# Patient Record
Sex: MALE | Race: WHITE | Employment: OTHER | URBAN - METROPOLITAN AREA
[De-identification: names, ages, dates, MRNs, and addresses within clinical notes are randomized per-mention and may not be internally consistent; named-entity substitution may affect disease eponyms.]

---

## 2020-01-01 ENCOUNTER — APPOINTMENT (OUTPATIENT)
Dept: GENERAL RADIOLOGY | Age: 55
DRG: 241 | End: 2020-01-01
Attending: HOSPITALIST
Payer: MEDICAID

## 2020-01-01 ENCOUNTER — HOME CARE VISIT (OUTPATIENT)
Dept: SCHEDULING | Facility: HOME HEALTH | Age: 55
End: 2020-01-01
Payer: MEDICAID

## 2020-01-01 ENCOUNTER — HOSPITAL ENCOUNTER (OUTPATIENT)
Dept: INTERVENTIONAL RADIOLOGY/VASCULAR | Age: 55
Discharge: HOME | End: 2020-10-29
Attending: STUDENT IN AN ORGANIZED HEALTH CARE EDUCATION/TRAINING PROGRAM
Payer: MEDICAID

## 2020-01-01 ENCOUNTER — HOSPITAL ENCOUNTER (OUTPATIENT)
Dept: INTERVENTIONAL RADIOLOGY/VASCULAR | Age: 55
Discharge: HOME | End: 2020-11-25
Attending: STUDENT IN AN ORGANIZED HEALTH CARE EDUCATION/TRAINING PROGRAM
Payer: MEDICAID

## 2020-01-01 ENCOUNTER — HOME CARE VISIT (OUTPATIENT)
Dept: HOSPICE | Facility: HOSPICE | Age: 55
End: 2020-01-01
Payer: MEDICAID

## 2020-01-01 ENCOUNTER — ANESTHESIA EVENT (OUTPATIENT)
Dept: ENDOSCOPY | Age: 55
DRG: 241 | End: 2020-01-01
Payer: MEDICAID

## 2020-01-01 ENCOUNTER — HOSPICE ADMISSION (OUTPATIENT)
Dept: HOSPICE | Facility: HOSPICE | Age: 55
End: 2020-01-01
Payer: MEDICAID

## 2020-01-01 ENCOUNTER — APPOINTMENT (OUTPATIENT)
Dept: CT IMAGING | Age: 55
DRG: 241 | End: 2020-01-01
Attending: HOSPITALIST
Payer: MEDICAID

## 2020-01-01 ENCOUNTER — ANESTHESIA (OUTPATIENT)
Dept: ENDOSCOPY | Age: 55
DRG: 241 | End: 2020-01-01
Payer: MEDICAID

## 2020-01-01 ENCOUNTER — HOSPITAL ENCOUNTER (OUTPATIENT)
Dept: MRI IMAGING | Age: 55
Discharge: HOME | End: 2020-12-01
Attending: STUDENT IN AN ORGANIZED HEALTH CARE EDUCATION/TRAINING PROGRAM
Payer: MEDICAID

## 2020-01-01 ENCOUNTER — HOSPITAL ENCOUNTER (OUTPATIENT)
Dept: LAB | Age: 55
Discharge: HOME | End: 2020-11-23
Payer: MEDICAID

## 2020-01-01 ENCOUNTER — HOSPITAL ENCOUNTER (OUTPATIENT)
Dept: LAB | Age: 55
Discharge: HOME | End: 2020-12-07
Payer: MEDICAID

## 2020-01-01 ENCOUNTER — HOSPITAL ENCOUNTER (OUTPATIENT)
Dept: INTERVENTIONAL RADIOLOGY/VASCULAR | Age: 55
Discharge: HOME | End: 2020-12-18
Attending: STUDENT IN AN ORGANIZED HEALTH CARE EDUCATION/TRAINING PROGRAM
Payer: MEDICAID

## 2020-01-01 ENCOUNTER — TRANSCRIBE ORDER (OUTPATIENT)
Dept: SCHEDULING | Age: 55
End: 2020-01-01

## 2020-01-01 ENCOUNTER — HOSPITAL ENCOUNTER (INPATIENT)
Age: 55
LOS: 1 days | Discharge: HOSPICE | DRG: 241 | End: 2020-12-28
Attending: EMERGENCY MEDICINE | Admitting: HOSPITALIST
Payer: MEDICAID

## 2020-01-01 VITALS — RESPIRATION RATE: 18 BRPM | OXYGEN SATURATION: 95 % | HEART RATE: 87 BPM | TEMPERATURE: 98.5 F

## 2020-01-01 VITALS
HEART RATE: 98 BPM | RESPIRATION RATE: 20 BRPM | TEMPERATURE: 98.5 F | OXYGEN SATURATION: 96 % | SYSTOLIC BLOOD PRESSURE: 165 MMHG | DIASTOLIC BLOOD PRESSURE: 100 MMHG

## 2020-01-01 VITALS — OXYGEN SATURATION: 93 % | SYSTOLIC BLOOD PRESSURE: 154 MMHG | DIASTOLIC BLOOD PRESSURE: 96 MMHG | HEART RATE: 86 BPM

## 2020-01-01 VITALS
TEMPERATURE: 97.7 F | DIASTOLIC BLOOD PRESSURE: 77 MMHG | BODY MASS INDEX: 28.07 KG/M2 | OXYGEN SATURATION: 94 % | HEIGHT: 69 IN | WEIGHT: 189.5 LBS | HEART RATE: 102 BPM | RESPIRATION RATE: 21 BRPM | SYSTOLIC BLOOD PRESSURE: 136 MMHG

## 2020-01-01 VITALS
HEART RATE: 96 BPM | RESPIRATION RATE: 20 BRPM | HEIGHT: 70 IN | TEMPERATURE: 98 F | DIASTOLIC BLOOD PRESSURE: 80 MMHG | BODY MASS INDEX: 27.06 KG/M2 | SYSTOLIC BLOOD PRESSURE: 130 MMHG | WEIGHT: 189 LBS

## 2020-01-01 DIAGNOSIS — K70.31 ALCOHOLIC CIRRHOSIS OF LIVER WITH ASCITES (HCC): ICD-10-CM

## 2020-01-01 DIAGNOSIS — K92.2 GASTROINTESTINAL HEMORRHAGE, UNSPECIFIED GASTROINTESTINAL HEMORRHAGE TYPE: ICD-10-CM

## 2020-01-01 DIAGNOSIS — R16.0 LIVER MASS: ICD-10-CM

## 2020-01-01 DIAGNOSIS — E87.5 ACUTE HYPERKALEMIA: ICD-10-CM

## 2020-01-01 DIAGNOSIS — R63.0 DECREASED APPETITE: ICD-10-CM

## 2020-01-01 DIAGNOSIS — N17.9 ACUTE KIDNEY INJURY (HCC): ICD-10-CM

## 2020-01-01 DIAGNOSIS — K72.90 HEPATIC FAILURE, UNSPECIFIED WITHOUT COMA (HCC): ICD-10-CM

## 2020-01-01 DIAGNOSIS — F41.9 ANXIETY: ICD-10-CM

## 2020-01-01 DIAGNOSIS — K70.31 ASCITES DUE TO ALCOHOLIC CIRRHOSIS (HCC): ICD-10-CM

## 2020-01-01 DIAGNOSIS — K70.31 ASCITES DUE TO ALCOHOLIC CIRRHOSIS (HCC): Primary | ICD-10-CM

## 2020-01-01 DIAGNOSIS — F10.10 ALCOHOL ABUSE, UNCOMPLICATED: ICD-10-CM

## 2020-01-01 DIAGNOSIS — C22.0 HEPATOCELLULAR CARCINOMA (HCC): Primary | Chronic | ICD-10-CM

## 2020-01-01 LAB
25(OH)D3+25(OH)D2 SERPL-MCNC: 22.5 NG/ML (ref 30–100)
ABO + RH BLD: NORMAL
AFP-TM SERPL-MCNC: 68.3 NG/ML
ALBUMIN FLD-MCNC: 0.3 G/DL
ALBUMIN FLD-MCNC: 0.5 G/DL
ALBUMIN SERPL-MCNC: 1.7 G/DL (ref 3.5–5)
ALBUMIN SERPL-MCNC: 1.8 G/DL (ref 3.5–5)
ALBUMIN SERPL-MCNC: 2.1 G/DL (ref 3.5–5)
ALBUMIN SERPL-MCNC: 2.3 G/DL (ref 3.5–5)
ALBUMIN/GLOB SERPL: 0.3 {RATIO} (ref 1.2–3.5)
ALP SERPL-CCNC: 146 U/L (ref 50–136)
ALP SERPL-CCNC: 157 U/L (ref 50–136)
ALP SERPL-CCNC: 86 U/L (ref 50–136)
ALP SERPL-CCNC: 96 U/L (ref 50–136)
ALT SERPL-CCNC: 111 U/L (ref 12–65)
ALT SERPL-CCNC: 155 U/L (ref 12–65)
ALT SERPL-CCNC: 93 U/L (ref 12–65)
ALT SERPL-CCNC: 96 U/L (ref 12–65)
AMMONIA PLAS-SCNC: 22 UMOL/L (ref 24.9–68)
ANION GAP SERPL CALC-SCNC: 14 MMOL/L (ref 7–16)
ANION GAP SERPL CALC-SCNC: 16 MMOL/L (ref 7–16)
ANION GAP SERPL CALC-SCNC: 17 MMOL/L (ref 7–16)
ANION GAP SERPL CALC-SCNC: 8 MMOL/L (ref 7–16)
ANION GAP SERPL CALC-SCNC: 8 MMOL/L (ref 7–16)
APPEARANCE FLD: CLEAR
APPEARANCE FLD: NORMAL
APPEARANCE FLD: NORMAL
APPEARANCE UR: ABNORMAL
APTT PPP: 32.1 SEC (ref 24.1–35.1)
APTT PPP: 33.4 SEC (ref 24.1–35.1)
AST SERPL-CCNC: 176 U/L (ref 15–37)
AST SERPL-CCNC: 195 U/L (ref 15–37)
AST SERPL-CCNC: 252 U/L (ref 15–37)
AST SERPL-CCNC: 273 U/L (ref 15–37)
ATRIAL RATE: 103 BPM
BACTERIA URNS QL MICRO: ABNORMAL /HPF
BASOPHILS # BLD: 0 K/UL (ref 0–0.2)
BASOPHILS NFR BLD: 0 % (ref 0–2)
BASOPHILS NFR BLD: 1 % (ref 0–2)
BILIRUB SERPL-MCNC: 2.4 MG/DL (ref 0.2–1.1)
BILIRUB SERPL-MCNC: 3.5 MG/DL (ref 0.2–1.1)
BILIRUB SERPL-MCNC: 3.8 MG/DL (ref 0.2–1.1)
BILIRUB SERPL-MCNC: 4.4 MG/DL (ref 0.2–1.1)
BILIRUB UR QL: ABNORMAL
BLD PROD TYP BPU: NORMAL
BLD PROD TYP BPU: NORMAL
BLOOD GROUP ANTIBODIES SERPL: NORMAL
BPU ID: NORMAL
BPU ID: NORMAL
BUN SERPL-MCNC: 22 MG/DL (ref 6–23)
BUN SERPL-MCNC: 27 MG/DL (ref 6–23)
BUN SERPL-MCNC: 86 MG/DL (ref 6–23)
BUN SERPL-MCNC: 88 MG/DL (ref 6–23)
BUN SERPL-MCNC: 90 MG/DL (ref 6–23)
CALCIUM SERPL-MCNC: 8.3 MG/DL (ref 8.3–10.4)
CALCIUM SERPL-MCNC: 8.7 MG/DL (ref 8.3–10.4)
CALCIUM SERPL-MCNC: 8.8 MG/DL (ref 8.3–10.4)
CALCIUM SERPL-MCNC: 9.4 MG/DL (ref 8.3–10.4)
CALCIUM SERPL-MCNC: 9.5 MG/DL (ref 8.3–10.4)
CALCULATED P AXIS, ECG09: 34 DEGREES
CALCULATED R AXIS, ECG10: 44 DEGREES
CALCULATED T AXIS, ECG11: -11 DEGREES
CASTS URNS QL MICRO: ABNORMAL /LPF
CEA SERPL-MCNC: 4.3 NG/ML (ref 0–3)
CHLORIDE SERPL-SCNC: 102 MMOL/L (ref 98–107)
CHLORIDE SERPL-SCNC: 106 MMOL/L (ref 98–107)
CHLORIDE SERPL-SCNC: 106 MMOL/L (ref 98–107)
CHLORIDE SERPL-SCNC: 108 MMOL/L (ref 98–107)
CHLORIDE SERPL-SCNC: 99 MMOL/L (ref 98–107)
CO2 SERPL-SCNC: 12 MMOL/L (ref 21–32)
CO2 SERPL-SCNC: 13 MMOL/L (ref 21–32)
CO2 SERPL-SCNC: 14 MMOL/L (ref 21–32)
CO2 SERPL-SCNC: 21 MMOL/L (ref 21–32)
CO2 SERPL-SCNC: 23 MMOL/L (ref 21–32)
COLOR FLD: NORMAL
COLOR FLD: YELLOW
COLOR FLD: YELLOW
COLOR UR: ABNORMAL
CREAT SERPL-MCNC: 1.04 MG/DL (ref 0.8–1.5)
CREAT SERPL-MCNC: 1.1 MG/DL (ref 0.8–1.5)
CREAT SERPL-MCNC: 2.18 MG/DL (ref 0.8–1.5)
CREAT SERPL-MCNC: 2.56 MG/DL (ref 0.8–1.5)
CREAT SERPL-MCNC: 3 MG/DL (ref 0.8–1.5)
CROSSMATCH RESULT,%XM: NORMAL
CROSSMATCH RESULT,%XM: NORMAL
DIAGNOSIS, 93000: NORMAL
DIFFERENTIAL METHOD BLD: ABNORMAL
EOSINOPHIL # BLD: 0 K/UL (ref 0–0.8)
EOSINOPHIL # BLD: 0 K/UL (ref 0–0.8)
EOSINOPHIL # BLD: 0.2 K/UL (ref 0–0.8)
EOSINOPHIL # BLD: 0.3 K/UL (ref 0–0.8)
EOSINOPHIL NFR BLD: 0 % (ref 0.5–7.8)
EOSINOPHIL NFR BLD: 0 % (ref 0.5–7.8)
EOSINOPHIL NFR BLD: 4 % (ref 0.5–7.8)
EOSINOPHIL NFR BLD: 5 % (ref 0.5–7.8)
EPI CELLS #/AREA URNS HPF: ABNORMAL /HPF
ERYTHROCYTE [DISTWIDTH] IN BLOOD BY AUTOMATED COUNT: 14.6 % (ref 11.9–14.6)
ERYTHROCYTE [DISTWIDTH] IN BLOOD BY AUTOMATED COUNT: 15.8 % (ref 11.9–14.6)
ERYTHROCYTE [DISTWIDTH] IN BLOOD BY AUTOMATED COUNT: 17.4 % (ref 11.9–14.6)
ERYTHROCYTE [DISTWIDTH] IN BLOOD BY AUTOMATED COUNT: 17.7 % (ref 11.9–14.6)
FERRITIN SERPL-MCNC: 1892 NG/ML (ref 8–388)
FLUID COMMENTS, FCOM: NORMAL
GLOBULIN SER CALC-MCNC: 5 G/DL (ref 2.3–3.5)
GLOBULIN SER CALC-MCNC: 5.7 G/DL (ref 2.3–3.5)
GLOBULIN SER CALC-MCNC: 6.4 G/DL (ref 2.3–3.5)
GLOBULIN SER CALC-MCNC: 6.7 G/DL (ref 2.3–3.5)
GLUCOSE SERPL-MCNC: 106 MG/DL (ref 65–100)
GLUCOSE SERPL-MCNC: 119 MG/DL (ref 65–100)
GLUCOSE SERPL-MCNC: 136 MG/DL (ref 65–100)
GLUCOSE SERPL-MCNC: 150 MG/DL (ref 65–100)
GLUCOSE SERPL-MCNC: 165 MG/DL (ref 65–100)
GLUCOSE UR STRIP.AUTO-MCNC: 100 MG/DL
HCT VFR BLD AUTO: 22 % (ref 41.1–50.3)
HCT VFR BLD AUTO: 27 % (ref 41.1–50.3)
HCT VFR BLD AUTO: 36.4 % (ref 41.1–50.3)
HCT VFR BLD AUTO: 36.9 % (ref 41.1–50.3)
HGB BLD-MCNC: 12.6 G/DL (ref 13.6–17.2)
HGB BLD-MCNC: 13.3 G/DL (ref 13.6–17.2)
HGB BLD-MCNC: 6.8 G/DL (ref 13.6–17.2)
HGB BLD-MCNC: 7.4 G/DL (ref 13.6–17.2)
HGB BLD-MCNC: 7.5 G/DL (ref 13.6–17.2)
HGB BLD-MCNC: 8 G/DL (ref 13.6–17.2)
HGB BLD-MCNC: 8.8 G/DL (ref 13.6–17.2)
HGB BLD-MCNC: 9.3 G/DL (ref 13.6–17.2)
HGB UR QL STRIP: ABNORMAL
HISTORY CHECKED?,CKHIST: NORMAL
IMM GRANULOCYTES # BLD AUTO: 0 K/UL (ref 0–0.5)
IMM GRANULOCYTES # BLD AUTO: 0.1 K/UL (ref 0–0.5)
IMM GRANULOCYTES NFR BLD AUTO: 0 % (ref 0–5)
IMM GRANULOCYTES NFR BLD AUTO: 1 % (ref 0–5)
INR PPP: 1.2
INR PPP: 1.2
INR PPP: 1.7
INR PPP: 1.9
IRON SATN MFR SERPL: 38 %
IRON SERPL-MCNC: 88 UG/DL (ref 35–150)
KETONES UR QL STRIP.AUTO: ABNORMAL MG/DL
LACTATE SERPL-SCNC: 6 MMOL/L (ref 0.4–2)
LACTATE SERPL-SCNC: 7.2 MMOL/L (ref 0.4–2)
LACTATE SERPL-SCNC: 8.1 MMOL/L (ref 0.4–2)
LEUKOCYTE ESTERASE UR QL STRIP.AUTO: ABNORMAL
LYMPHOCYTES # BLD: 0.9 K/UL (ref 0.5–4.6)
LYMPHOCYTES # BLD: 1.1 K/UL (ref 0.5–4.6)
LYMPHOCYTES # BLD: 1.2 K/UL (ref 0.5–4.6)
LYMPHOCYTES # BLD: 1.2 K/UL (ref 0.5–4.6)
LYMPHOCYTES NFR BLD: 15 % (ref 13–44)
LYMPHOCYTES NFR BLD: 17 % (ref 13–44)
LYMPHOCYTES NFR BLD: 17 % (ref 13–44)
LYMPHOCYTES NFR BLD: 19 % (ref 13–44)
LYMPHOCYTES NFR BRONCH MANUAL: 66 %
LYMPHOCYTES NFR BRONCH MANUAL: 92 %
MACROPHAGES NFR BRONCH MANUAL: 30 %
MACROPHAGES NFR BRONCH MANUAL: 6 %
MAGNESIUM SERPL-MCNC: 2.2 MG/DL (ref 1.8–2.4)
MCH RBC QN AUTO: 33.9 PG (ref 26.1–32.9)
MCH RBC QN AUTO: 34.8 PG (ref 26.1–32.9)
MCH RBC QN AUTO: 35.2 PG (ref 26.1–32.9)
MCH RBC QN AUTO: 35.4 PG (ref 26.1–32.9)
MCHC RBC AUTO-ENTMCNC: 34.1 G/DL (ref 31.4–35)
MCHC RBC AUTO-ENTMCNC: 34.4 G/DL (ref 31.4–35)
MCHC RBC AUTO-ENTMCNC: 34.6 G/DL (ref 31.4–35)
MCHC RBC AUTO-ENTMCNC: 36 G/DL (ref 31.4–35)
MCV RBC AUTO: 102.3 FL (ref 79.6–97.8)
MCV RBC AUTO: 103.8 FL (ref 79.6–97.8)
MCV RBC AUTO: 96.6 FL (ref 79.6–97.8)
MCV RBC AUTO: 97.8 FL (ref 79.6–97.8)
MONOCYTES # BLD: 0.5 K/UL (ref 0.1–1.3)
MONOCYTES # BLD: 0.5 K/UL (ref 0.1–1.3)
MONOCYTES # BLD: 0.6 K/UL (ref 0.1–1.3)
MONOCYTES # BLD: 0.8 K/UL (ref 0.1–1.3)
MONOCYTES NFR BLD: 11 % (ref 4–12)
MONOCYTES NFR BLD: 7 % (ref 4–12)
MONOCYTES NFR BLD: 9 % (ref 4–12)
MONOCYTES NFR BLD: 9 % (ref 4–12)
NEUTROPHILS NFR BRONCH MANUAL: 2 %
NEUTROPHILS NFR BRONCH MANUAL: 4 %
NEUTS SEG # BLD: 3.6 K/UL (ref 1.7–8.2)
NEUTS SEG # BLD: 3.8 K/UL (ref 1.7–8.2)
NEUTS SEG # BLD: 5.5 K/UL (ref 1.7–8.2)
NEUTS SEG # BLD: 6.1 K/UL (ref 1.7–8.2)
NEUTS SEG NFR BLD: 67 % (ref 43–78)
NEUTS SEG NFR BLD: 67 % (ref 43–78)
NEUTS SEG NFR BLD: 74 % (ref 43–78)
NEUTS SEG NFR BLD: 75 % (ref 43–78)
NITRITE UR QL STRIP.AUTO: POSITIVE
NRBC # BLD: 0 K/UL (ref 0–0.2)
NUC CELL # FLD: 120 /CU MM
NUC CELL # FLD: 180 /CU MM
NUC CELL # FLD: <100 /CU MM
P-R INTERVAL, ECG05: 158 MS
PH UR STRIP: 5.5 [PH] (ref 5–9)
PHOSPHATE SERPL-MCNC: 5.1 MG/DL (ref 2.5–4.5)
PLATELET # BLD AUTO: 116 K/UL (ref 150–450)
PLATELET # BLD AUTO: 121 K/UL (ref 150–450)
PLATELET # BLD AUTO: 79 K/UL (ref 150–450)
PLATELET # BLD AUTO: 87 K/UL (ref 150–450)
PMV BLD AUTO: 10.8 FL (ref 9.4–12.3)
PMV BLD AUTO: 11 FL (ref 9.4–12.3)
PMV BLD AUTO: 11.6 FL (ref 9.4–12.3)
PMV BLD AUTO: 12.6 FL (ref 9.4–12.3)
POTASSIUM SERPL-SCNC: 4.6 MMOL/L (ref 3.5–5.1)
POTASSIUM SERPL-SCNC: 5 MMOL/L (ref 3.5–5.1)
POTASSIUM SERPL-SCNC: 5.4 MMOL/L (ref 3.5–5.1)
POTASSIUM SERPL-SCNC: 5.9 MMOL/L (ref 3.5–5.1)
POTASSIUM SERPL-SCNC: 6.2 MMOL/L (ref 3.5–5.1)
POTASSIUM SERPL-SCNC: 6.5 MMOL/L (ref 3.5–5.1)
PROT FLD-MCNC: 0.9 G/DL
PROT FLD-MCNC: 1.1 G/DL
PROT FLD-MCNC: 1.4 G/DL
PROT SERPL-MCNC: 6.7 G/DL (ref 6.3–8.2)
PROT SERPL-MCNC: 7.5 G/DL (ref 6.3–8.2)
PROT SERPL-MCNC: 8.5 G/DL (ref 6.3–8.2)
PROT SERPL-MCNC: 9 G/DL (ref 6.3–8.2)
PROT UR STRIP-MCNC: 100 MG/DL
PROTHROMBIN TIME: 15 SEC (ref 12.5–14.7)
PROTHROMBIN TIME: 15.2 SEC (ref 12.5–14.7)
PROTHROMBIN TIME: 20.4 SEC (ref 12.5–14.7)
PROTHROMBIN TIME: 21.9 SEC (ref 12.5–14.7)
Q-T INTERVAL, ECG07: 308 MS
QRS DURATION, ECG06: 92 MS
QTC CALCULATION (BEZET), ECG08: 403 MS
RBC # BLD AUTO: 2.12 M/UL (ref 4.23–5.6)
RBC # BLD AUTO: 2.64 M/UL (ref 4.23–5.6)
RBC # BLD AUTO: 3.72 M/UL (ref 4.23–5.67)
RBC # BLD AUTO: 3.82 M/UL (ref 4.23–5.6)
RBC # FLD: 1000 /CU MM
RBC # FLD: 4000 /CU MM
RBC # FLD: NORMAL /CU MM
RBC #/AREA URNS HPF: >100 /HPF
SODIUM SERPL-SCNC: 130 MMOL/L (ref 138–145)
SODIUM SERPL-SCNC: 131 MMOL/L (ref 136–145)
SODIUM SERPL-SCNC: 134 MMOL/L (ref 136–145)
SODIUM SERPL-SCNC: 136 MMOL/L (ref 136–145)
SODIUM SERPL-SCNC: 136 MMOL/L (ref 136–145)
SP GR UR REFRACTOMETRY: 1.02 (ref 1–1.02)
SPECIMEN EXP DATE BLD: NORMAL
SPECIMEN SOURCE FLD: 1
SPECIMEN SOURCE FLD: 1
SPECIMEN SOURCE FLD: NORMAL
STATUS OF UNIT,%ST: NORMAL
STATUS OF UNIT,%ST: NORMAL
TIBC SERPL-MCNC: 232 UG/DL (ref 250–450)
UNIT DIVISION, %UDIV: 0
UNIT DIVISION, %UDIV: 0
UROBILINOGEN UR QL STRIP.AUTO: 1 EU/DL (ref 0.2–1)
VENTRICULAR RATE, ECG03: 103 BPM
VIT B12 SERPL-MCNC: 3686 PG/ML (ref 193–986)
VIT C SERPL-MCNC: <0.1 MG/DL (ref 0.4–2)
WBC # BLD AUTO: 5.4 K/UL (ref 4.3–11.1)
WBC # BLD AUTO: 5.7 K/UL (ref 4.3–11.1)
WBC # BLD AUTO: 7.3 K/UL (ref 4.3–11.1)
WBC # BLD AUTO: 8.3 K/UL (ref 4.3–11.1)
WBC URNS QL MICRO: >100 /HPF

## 2020-01-01 PROCEDURE — C9113 INJ PANTOPRAZOLE SODIUM, VIA: HCPCS | Performed by: HOSPITALIST

## 2020-01-01 PROCEDURE — 0651 HSPC ROUTINE HOME CARE

## 2020-01-01 PROCEDURE — 3331090004 HSPC SERVICE INTENSITY ADD-ON

## 2020-01-01 PROCEDURE — 84157 ASSAY OF PROTEIN OTHER: CPT

## 2020-01-01 PROCEDURE — G0155 HHCP-SVS OF CSW,EA 15 MIN: HCPCS

## 2020-01-01 PROCEDURE — 74011000250 HC RX REV CODE- 250: Performed by: NURSE ANESTHETIST, CERTIFIED REGISTERED

## 2020-01-01 PROCEDURE — 77030010507 HC ADH SKN DERMBND J&J -B

## 2020-01-01 PROCEDURE — 83605 ASSAY OF LACTIC ACID: CPT

## 2020-01-01 PROCEDURE — 88112 CYTOPATH CELL ENHANCE TECH: CPT

## 2020-01-01 PROCEDURE — 74011250637 HC RX REV CODE- 250/637: Performed by: HOSPITALIST

## 2020-01-01 PROCEDURE — 76060000032 HC ANESTHESIA 0.5 TO 1 HR: Performed by: INTERNAL MEDICINE

## 2020-01-01 PROCEDURE — 85730 THROMBOPLASTIN TIME PARTIAL: CPT

## 2020-01-01 PROCEDURE — P9047 ALBUMIN (HUMAN), 25%, 50ML: HCPCS | Performed by: PHYSICIAN ASSISTANT

## 2020-01-01 PROCEDURE — 82042 OTHER SOURCE ALBUMIN QUAN EA: CPT

## 2020-01-01 PROCEDURE — 85610 PROTHROMBIN TIME: CPT

## 2020-01-01 PROCEDURE — 74011000258 HC RX REV CODE- 258: Performed by: INTERNAL MEDICINE

## 2020-01-01 PROCEDURE — 0W3P8ZZ CONTROL BLEEDING IN GASTROINTESTINAL TRACT, VIA NATURAL OR ARTIFICIAL OPENING ENDOSCOPIC: ICD-10-PCS | Performed by: INTERNAL MEDICINE

## 2020-01-01 PROCEDURE — 93005 ELECTROCARDIOGRAM TRACING: CPT | Performed by: EMERGENCY MEDICINE

## 2020-01-01 PROCEDURE — C9113 INJ PANTOPRAZOLE SODIUM, VIA: HCPCS | Performed by: EMERGENCY MEDICINE

## 2020-01-01 PROCEDURE — 74011250636 HC RX REV CODE- 250/636: Performed by: HOSPITALIST

## 2020-01-01 PROCEDURE — 74011250636 HC RX REV CODE- 250/636: Performed by: STUDENT IN AN ORGANIZED HEALTH CARE EDUCATION/TRAINING PROGRAM

## 2020-01-01 PROCEDURE — 84132 ASSAY OF SERUM POTASSIUM: CPT

## 2020-01-01 PROCEDURE — 3E0G8GC INTRODUCTION OF OTHER THERAPEUTIC SUBSTANCE INTO UPPER GI, VIA NATURAL OR ARTIFICIAL OPENING ENDOSCOPIC: ICD-10-PCS | Performed by: INTERNAL MEDICINE

## 2020-01-01 PROCEDURE — HOSPICE MEDICATION HC HH HOSPICE MEDICATION

## 2020-01-01 PROCEDURE — 74011250637 HC RX REV CODE- 250/637: Performed by: INTERNAL MEDICINE

## 2020-01-01 PROCEDURE — 82378 CARCINOEMBRYONIC ANTIGEN: CPT

## 2020-01-01 PROCEDURE — 74011000250 HC RX REV CODE- 250: Performed by: HOSPITALIST

## 2020-01-01 PROCEDURE — 94664 DEMO&/EVAL PT USE INHALER: CPT

## 2020-01-01 PROCEDURE — 82306 VITAMIN D 25 HYDROXY: CPT

## 2020-01-01 PROCEDURE — 74011636637 HC RX REV CODE- 636/637: Performed by: HOSPITALIST

## 2020-01-01 PROCEDURE — 94760 N-INVAS EAR/PLS OXIMETRY 1: CPT

## 2020-01-01 PROCEDURE — 99285 EMERGENCY DEPT VISIT HI MDM: CPT

## 2020-01-01 PROCEDURE — 74011250636 HC RX REV CODE- 250/636: Performed by: PHYSICIAN ASSISTANT

## 2020-01-01 PROCEDURE — 85025 COMPLETE CBC W/AUTO DIFF WBC: CPT

## 2020-01-01 PROCEDURE — 89050 BODY FLUID CELL COUNT: CPT

## 2020-01-01 PROCEDURE — 2709999900 HC NON-CHARGEABLE SUPPLY

## 2020-01-01 PROCEDURE — 77030014146 HC TY THORCENT/PARACENT BD -B

## 2020-01-01 PROCEDURE — 2709999900 HC NON-CHARGEABLE SUPPLY: Performed by: INTERNAL MEDICINE

## 2020-01-01 PROCEDURE — 80053 COMPREHEN METABOLIC PANEL: CPT

## 2020-01-01 PROCEDURE — 82140 ASSAY OF AMMONIA: CPT

## 2020-01-01 PROCEDURE — 94644 CONT INHLJ TX 1ST HOUR: CPT

## 2020-01-01 PROCEDURE — 83540 ASSAY OF IRON: CPT

## 2020-01-01 PROCEDURE — 82607 VITAMIN B-12: CPT

## 2020-01-01 PROCEDURE — 74011000258 HC RX REV CODE- 258: Performed by: HOSPITALIST

## 2020-01-01 PROCEDURE — 81001 URINALYSIS AUTO W/SCOPE: CPT

## 2020-01-01 PROCEDURE — 86900 BLOOD TYPING SEROLOGIC ABO: CPT

## 2020-01-01 PROCEDURE — 96365 THER/PROPH/DIAG IV INF INIT: CPT

## 2020-01-01 PROCEDURE — 82105 ALPHA-FETOPROTEIN SERUM: CPT

## 2020-01-01 PROCEDURE — 76040000026: Performed by: INTERNAL MEDICINE

## 2020-01-01 PROCEDURE — 77030010936 HC CLP LIG BSC -C: Performed by: INTERNAL MEDICINE

## 2020-01-01 PROCEDURE — G0156 HHCP-SVS OF AIDE,EA 15 MIN: HCPCS

## 2020-01-01 PROCEDURE — 83735 ASSAY OF MAGNESIUM: CPT

## 2020-01-01 PROCEDURE — 74011636637 HC RX REV CODE- 636/637: Performed by: EMERGENCY MEDICINE

## 2020-01-01 PROCEDURE — 82180 ASSAY OF ASCORBIC ACID: CPT

## 2020-01-01 PROCEDURE — 86923 COMPATIBILITY TEST ELECTRIC: CPT

## 2020-01-01 PROCEDURE — 36415 COLL VENOUS BLD VENIPUNCTURE: CPT

## 2020-01-01 PROCEDURE — 3336500001 HSPC ELECTION

## 2020-01-01 PROCEDURE — 74011000250 HC RX REV CODE- 250: Performed by: EMERGENCY MEDICINE

## 2020-01-01 PROCEDURE — 84100 ASSAY OF PHOSPHORUS: CPT

## 2020-01-01 PROCEDURE — 74011000258 HC RX REV CODE- 258: Performed by: STUDENT IN AN ORGANIZED HEALTH CARE EDUCATION/TRAINING PROGRAM

## 2020-01-01 PROCEDURE — 74011250636 HC RX REV CODE- 250/636: Performed by: EMERGENCY MEDICINE

## 2020-01-01 PROCEDURE — 94640 AIRWAY INHALATION TREATMENT: CPT

## 2020-01-01 PROCEDURE — 77010033678 HC OXYGEN DAILY

## 2020-01-01 PROCEDURE — 49083 ABD PARACENTESIS W/IMAGING: CPT

## 2020-01-01 PROCEDURE — 85018 HEMOGLOBIN: CPT

## 2020-01-01 PROCEDURE — 77030029929: Performed by: INTERNAL MEDICINE

## 2020-01-01 PROCEDURE — G0299 HHS/HOSPICE OF RN EA 15 MIN: HCPCS

## 2020-01-01 PROCEDURE — A9575 INJ GADOTERATE MEGLUMI 0.1ML: HCPCS | Performed by: STUDENT IN AN ORGANIZED HEALTH CARE EDUCATION/TRAINING PROGRAM

## 2020-01-01 PROCEDURE — 96376 TX/PRO/DX INJ SAME DRUG ADON: CPT

## 2020-01-01 PROCEDURE — 74011250636 HC RX REV CODE- 250/636: Performed by: INTERNAL MEDICINE

## 2020-01-01 PROCEDURE — P9047 ALBUMIN (HUMAN), 25%, 50ML: HCPCS | Performed by: STUDENT IN AN ORGANIZED HEALTH CARE EDUCATION/TRAINING PROGRAM

## 2020-01-01 PROCEDURE — 82728 ASSAY OF FERRITIN: CPT

## 2020-01-01 PROCEDURE — 65610000001 HC ROOM ICU GENERAL

## 2020-01-01 PROCEDURE — 96375 TX/PRO/DX INJ NEW DRUG ADDON: CPT

## 2020-01-01 PROCEDURE — 74183 MRI ABD W/O CNTR FLWD CNTR: CPT

## 2020-01-01 PROCEDURE — 74022 RADEX COMPL AQT ABD SERIES: CPT

## 2020-01-01 PROCEDURE — 74011250636 HC RX REV CODE- 250/636: Performed by: NURSE ANESTHETIST, CERTIFIED REGISTERED

## 2020-01-01 RX ORDER — ALBUTEROL SULFATE 0.83 MG/ML
10 SOLUTION RESPIRATORY (INHALATION)
Status: COMPLETED | OUTPATIENT
Start: 2020-01-01 | End: 2020-01-01

## 2020-01-01 RX ORDER — SODIUM CHLORIDE 0.9 % (FLUSH) 0.9 %
10 SYRINGE (ML) INJECTION
Status: COMPLETED | OUTPATIENT
Start: 2020-01-01 | End: 2020-01-01

## 2020-01-01 RX ORDER — PHENOL/SODIUM PHENOLATE
20 AEROSOL, SPRAY (ML) MUCOUS MEMBRANE
COMMUNITY
End: 2020-01-01

## 2020-01-01 RX ORDER — SODIUM CHLORIDE 0.9 % (FLUSH) 0.9 %
5-40 SYRINGE (ML) INJECTION EVERY 8 HOURS
Status: DISCONTINUED | OUTPATIENT
Start: 2020-01-01 | End: 2020-01-01 | Stop reason: HOSPADM

## 2020-01-01 RX ORDER — LIDOCAINE HYDROCHLORIDE 20 MG/ML
2-10 INJECTION, SOLUTION INFILTRATION; PERINEURAL ONCE
Status: DISCONTINUED | OUTPATIENT
Start: 2020-01-01 | End: 2020-01-01 | Stop reason: HOSPADM

## 2020-01-01 RX ORDER — SODIUM BICARBONATE 1 MEQ/ML
50 SYRINGE (ML) INTRAVENOUS
Status: COMPLETED | OUTPATIENT
Start: 2020-01-01 | End: 2020-01-01

## 2020-01-01 RX ORDER — ONDANSETRON 2 MG/ML
4 INJECTION INTRAMUSCULAR; INTRAVENOUS
Status: DISCONTINUED | OUTPATIENT
Start: 2020-01-01 | End: 2020-01-01 | Stop reason: HOSPADM

## 2020-01-01 RX ORDER — HYDROXYZINE 25 MG/1
25 TABLET, FILM COATED ORAL
COMMUNITY
End: 2020-01-01

## 2020-01-01 RX ORDER — LORAZEPAM 2 MG/ML
1-2 INJECTION INTRAMUSCULAR
Status: DISCONTINUED | OUTPATIENT
Start: 2020-01-01 | End: 2020-01-01 | Stop reason: HOSPADM

## 2020-01-01 RX ORDER — SCOLOPAMINE TRANSDERMAL SYSTEM 1 MG/1
1 PATCH, EXTENDED RELEASE TRANSDERMAL
Qty: 3 PATCH | Refills: 1 | Status: SHIPPED | OUTPATIENT
Start: 2020-01-01 | End: 2020-01-01 | Stop reason: SDUPTHER

## 2020-01-01 RX ORDER — CALCIUM GLUCONATE 20 MG/ML
1 INJECTION, SOLUTION INTRAVENOUS ONCE
Status: COMPLETED | OUTPATIENT
Start: 2020-01-01 | End: 2020-01-01

## 2020-01-01 RX ORDER — DEXTROSE 50 % IN WATER (D50W) INTRAVENOUS SYRINGE
25 ONCE
Status: COMPLETED | OUTPATIENT
Start: 2020-01-01 | End: 2020-01-01

## 2020-01-01 RX ORDER — AMLODIPINE BESYLATE 5 MG/1
5 TABLET ORAL DAILY
Status: ON HOLD | COMMUNITY
End: 2020-01-01

## 2020-01-01 RX ORDER — DEXTROSE 50 % IN WATER (D50W) INTRAVENOUS SYRINGE
25
Status: COMPLETED | OUTPATIENT
Start: 2020-01-01 | End: 2020-01-01

## 2020-01-01 RX ORDER — SODIUM CHLORIDE 0.9 % (FLUSH) 0.9 %
5-40 SYRINGE (ML) INJECTION AS NEEDED
Status: DISCONTINUED | OUTPATIENT
Start: 2020-01-01 | End: 2020-01-01 | Stop reason: HOSPADM

## 2020-01-01 RX ORDER — PROPOFOL 10 MG/ML
INJECTION, EMULSION INTRAVENOUS AS NEEDED
Status: DISCONTINUED | OUTPATIENT
Start: 2020-01-01 | End: 2020-01-01 | Stop reason: HOSPADM

## 2020-01-01 RX ORDER — MORPHINE SULFATE ORAL SOLUTION 10 MG/5ML
1 SOLUTION ORAL
Qty: 1 BOTTLE | Refills: 0 | Status: SHIPPED | OUTPATIENT
Start: 2020-01-01 | End: 2020-01-01

## 2020-01-01 RX ORDER — LABETALOL HYDROCHLORIDE 5 MG/ML
INJECTION, SOLUTION INTRAVENOUS AS NEEDED
Status: DISCONTINUED | OUTPATIENT
Start: 2020-01-01 | End: 2020-01-01 | Stop reason: HOSPADM

## 2020-01-01 RX ORDER — MORPHINE SULFATE ORAL SOLUTION 10 MG/5ML
5 SOLUTION ORAL
Status: DISCONTINUED | OUTPATIENT
Start: 2020-01-01 | End: 2020-01-01 | Stop reason: HOSPADM

## 2020-01-01 RX ORDER — HALOPERIDOL 5 MG/ML
2 INJECTION INTRAMUSCULAR ONCE
Status: COMPLETED | OUTPATIENT
Start: 2020-01-01 | End: 2020-01-01

## 2020-01-01 RX ORDER — LORAZEPAM 2 MG/ML
1 INJECTION INTRAMUSCULAR
Status: DISCONTINUED | OUTPATIENT
Start: 2020-01-01 | End: 2020-01-01

## 2020-01-01 RX ORDER — MORPHINE SULFATE ORAL SOLUTION 10 MG/5ML
5 SOLUTION ORAL
Qty: 300 ML | Refills: 0 | Status: SHIPPED | OUTPATIENT
Start: 2020-01-01 | End: 2020-01-01

## 2020-01-01 RX ORDER — MORPHINE SULFATE 2 MG/ML
2 INJECTION, SOLUTION INTRAMUSCULAR; INTRAVENOUS
Status: DISCONTINUED | OUTPATIENT
Start: 2020-01-01 | End: 2020-01-01 | Stop reason: HOSPADM

## 2020-01-01 RX ORDER — ALBUMIN HUMAN 250 G/1000ML
12.5 SOLUTION INTRAVENOUS ONCE
Status: COMPLETED | OUTPATIENT
Start: 2020-01-01 | End: 2020-01-01

## 2020-01-01 RX ORDER — GADOTERATE MEGLUMINE 376.9 MG/ML
19 INJECTION INTRAVENOUS
Status: COMPLETED | OUTPATIENT
Start: 2020-01-01 | End: 2020-01-01

## 2020-01-01 RX ORDER — HEPARIN SODIUM 1000 [USP'U]/ML
5000 INJECTION, SOLUTION INTRAVENOUS; SUBCUTANEOUS ONCE
Status: DISCONTINUED | OUTPATIENT
Start: 2020-01-01 | End: 2020-01-01 | Stop reason: HOSPADM

## 2020-01-01 RX ORDER — ONDANSETRON HYDROCHLORIDE 4 MG/5ML
4 SOLUTION ORAL
Qty: 150 ML | Refills: 0 | Status: SHIPPED | OUTPATIENT
Start: 2020-01-01

## 2020-01-01 RX ORDER — ONDANSETRON 2 MG/ML
4 INJECTION INTRAMUSCULAR; INTRAVENOUS
Status: COMPLETED | OUTPATIENT
Start: 2020-01-01 | End: 2020-01-01

## 2020-01-01 RX ORDER — ONDANSETRON 2 MG/ML
INJECTION INTRAMUSCULAR; INTRAVENOUS AS NEEDED
Status: DISCONTINUED | OUTPATIENT
Start: 2020-01-01 | End: 2020-01-01 | Stop reason: HOSPADM

## 2020-01-01 RX ORDER — SODIUM CHLORIDE, SODIUM LACTATE, POTASSIUM CHLORIDE, CALCIUM CHLORIDE 600; 310; 30; 20 MG/100ML; MG/100ML; MG/100ML; MG/100ML
100 INJECTION, SOLUTION INTRAVENOUS CONTINUOUS
Status: DISCONTINUED | OUTPATIENT
Start: 2020-01-01 | End: 2020-01-01 | Stop reason: HOSPADM

## 2020-01-01 RX ORDER — SPIRONOLACTONE AND HYDROCHLOROTHIAZIDE 25; 25 MG/1; MG/1
200 TABLET ORAL DAILY
COMMUNITY
End: 2020-01-01 | Stop reason: CLARIF

## 2020-01-01 RX ORDER — CALCIUM CHLORIDE INJECTION 100 MG/ML
1 INJECTION, SOLUTION INTRAVENOUS
Status: COMPLETED | OUTPATIENT
Start: 2020-01-01 | End: 2020-01-01

## 2020-01-01 RX ORDER — SODIUM CHLORIDE 9 MG/ML
250 INJECTION, SOLUTION INTRAVENOUS AS NEEDED
Status: DISCONTINUED | OUTPATIENT
Start: 2020-01-01 | End: 2020-01-01 | Stop reason: HOSPADM

## 2020-01-01 RX ORDER — OCTREOTIDE ACETATE 100 UG/ML
50 INJECTION, SOLUTION INTRAVENOUS; SUBCUTANEOUS
Status: COMPLETED | OUTPATIENT
Start: 2020-01-01 | End: 2020-01-01

## 2020-01-01 RX ORDER — SCOLOPAMINE TRANSDERMAL SYSTEM 1 MG/1
1 PATCH, EXTENDED RELEASE TRANSDERMAL
Status: DISCONTINUED | OUTPATIENT
Start: 2020-01-01 | End: 2020-01-01 | Stop reason: HOSPADM

## 2020-01-01 RX ORDER — SODIUM CHLORIDE, SODIUM LACTATE, POTASSIUM CHLORIDE, CALCIUM CHLORIDE 600; 310; 30; 20 MG/100ML; MG/100ML; MG/100ML; MG/100ML
75 INJECTION, SOLUTION INTRAVENOUS CONTINUOUS
Status: DISCONTINUED | OUTPATIENT
Start: 2020-01-01 | End: 2020-01-01

## 2020-01-01 RX ORDER — LACTULOSE 10 G/15ML
1 SOLUTION ORAL; RECTAL 3 TIMES DAILY
COMMUNITY
End: 2020-01-01

## 2020-01-01 RX ORDER — PROPOFOL 10 MG/ML
INJECTION, EMULSION INTRAVENOUS
Status: DISCONTINUED | OUTPATIENT
Start: 2020-01-01 | End: 2020-01-01 | Stop reason: HOSPADM

## 2020-01-01 RX ORDER — FUROSEMIDE 80 MG/1
80 TABLET ORAL DAILY
COMMUNITY
End: 2020-01-01

## 2020-01-01 RX ORDER — ALBUMIN HUMAN 250 G/1000ML
25 SOLUTION INTRAVENOUS EVERY 6 HOURS
Status: DISCONTINUED | OUTPATIENT
Start: 2020-01-01 | End: 2020-01-01

## 2020-01-01 RX ORDER — SCOLOPAMINE TRANSDERMAL SYSTEM 1 MG/1
1 PATCH, EXTENDED RELEASE TRANSDERMAL
Qty: 3 PATCH | Refills: 0 | Status: SHIPPED | OUTPATIENT
Start: 2020-01-01

## 2020-01-01 RX ORDER — DIAZEPAM 5 MG/ML
5 INJECTION, SOLUTION INTRAMUSCULAR; INTRAVENOUS
Qty: 200 ML | Refills: 0 | Status: SHIPPED | OUTPATIENT
Start: 2020-01-01 | End: 2020-01-01

## 2020-01-01 RX ORDER — DIAZEPAM ORAL 5 MG/5ML
2 SOLUTION ORAL
Qty: 200 ML | Refills: 0 | Status: SHIPPED | OUTPATIENT
Start: 2020-01-01

## 2020-01-01 RX ORDER — SPIRONOLACTONE 100 MG/1
200 TABLET, FILM COATED ORAL DAILY
Status: ON HOLD | COMMUNITY
End: 2020-01-01

## 2020-01-01 RX ORDER — DIAZEPAM ORAL 5 MG/5ML
2 SOLUTION ORAL
Qty: 1 BOTTLE | Refills: 0 | Status: SHIPPED | OUTPATIENT
Start: 2020-01-01 | End: 2020-01-01 | Stop reason: SDUPTHER

## 2020-01-01 RX ORDER — SODIUM CHLORIDE, SODIUM LACTATE, POTASSIUM CHLORIDE, CALCIUM CHLORIDE 600; 310; 30; 20 MG/100ML; MG/100ML; MG/100ML; MG/100ML
INJECTION, SOLUTION INTRAVENOUS
Status: DISCONTINUED | OUTPATIENT
Start: 2020-01-01 | End: 2020-01-01 | Stop reason: HOSPADM

## 2020-01-01 RX ADMIN — SODIUM CHLORIDE 1000 ML: 900 INJECTION, SOLUTION INTRAVENOUS at 17:04

## 2020-01-01 RX ADMIN — OCTREOTIDE ACETATE 50 MCG: 100 INJECTION, SOLUTION INTRAVENOUS; SUBCUTANEOUS at 17:11

## 2020-01-01 RX ADMIN — PROMETHAZINE HYDROCHLORIDE 12.5 MG: 25 INJECTION INTRAMUSCULAR; INTRAVENOUS at 23:21

## 2020-01-01 RX ADMIN — CALCIUM GLUCONATE 1000 MG: 20 INJECTION, SOLUTION INTRAVENOUS at 01:13

## 2020-01-01 RX ADMIN — LORAZEPAM 1 MG: 2 INJECTION INTRAMUSCULAR; INTRAVENOUS at 01:20

## 2020-01-01 RX ADMIN — ALBUMIN (HUMAN) 12.5 G: 0.25 INJECTION, SOLUTION INTRAVENOUS at 14:07

## 2020-01-01 RX ADMIN — PANTOPRAZOLE SODIUM 8 MG/HR: 40 INJECTION, POWDER, FOR SOLUTION INTRAVENOUS at 10:58

## 2020-01-01 RX ADMIN — ONDANSETRON 4 MG: 2 INJECTION INTRAMUSCULAR; INTRAVENOUS at 11:22

## 2020-01-01 RX ADMIN — INSULIN HUMAN 8 UNITS: 100 INJECTION, SOLUTION PARENTERAL at 00:18

## 2020-01-01 RX ADMIN — Medication 10 ML: at 18:47

## 2020-01-01 RX ADMIN — HALOPERIDOL LACTATE 2 MG: 5 INJECTION, SOLUTION INTRAMUSCULAR at 06:00

## 2020-01-01 RX ADMIN — OCTREOTIDE ACETATE 50 MCG/HR: 500 INJECTION, SOLUTION INTRAVENOUS; SUBCUTANEOUS at 17:11

## 2020-01-01 RX ADMIN — GADOTERATE MEGLUMINE 15 ML: 376.9 INJECTION INTRAVENOUS at 18:47

## 2020-01-01 RX ADMIN — LABETALOL HYDROCHLORIDE 5 MG: 5 INJECTION INTRAVENOUS at 20:53

## 2020-01-01 RX ADMIN — ALBUTEROL SULFATE 10 MG: 2.5 SOLUTION RESPIRATORY (INHALATION) at 16:35

## 2020-01-01 RX ADMIN — PIPERACILLIN AND TAZOBACTAM 3.38 G: 3; .375 INJECTION, POWDER, LYOPHILIZED, FOR SOLUTION INTRAVENOUS at 10:59

## 2020-01-01 RX ADMIN — PANTOPRAZOLE SODIUM 8 MG/HR: 40 INJECTION, POWDER, FOR SOLUTION INTRAVENOUS at 23:18

## 2020-01-01 RX ADMIN — MORPHINE SULFATE 2 MG: 2 INJECTION, SOLUTION INTRAMUSCULAR; INTRAVENOUS at 06:02

## 2020-01-01 RX ADMIN — DEXTROSE MONOHYDRATE 25 G: 25 INJECTION, SOLUTION INTRAVENOUS at 00:18

## 2020-01-01 RX ADMIN — PANTOPRAZOLE SODIUM 80 MG: 40 INJECTION, POWDER, FOR SOLUTION INTRAVENOUS at 16:56

## 2020-01-01 RX ADMIN — ONDANSETRON 4 MG: 2 INJECTION INTRAMUSCULAR; INTRAVENOUS at 20:33

## 2020-01-01 RX ADMIN — DEXTROSE MONOHYDRATE 25 G: 25 INJECTION, SOLUTION INTRAVENOUS at 16:55

## 2020-01-01 RX ADMIN — Medication 10 ML: at 22:19

## 2020-01-01 RX ADMIN — SODIUM CHLORIDE 100 ML: 900 INJECTION, SOLUTION INTRAVENOUS at 18:47

## 2020-01-01 RX ADMIN — MORPHINE SULFATE 5 MG: 10 SOLUTION ORAL at 14:44

## 2020-01-01 RX ADMIN — CEFTRIAXONE 1 G: 1 INJECTION, POWDER, FOR SOLUTION INTRAMUSCULAR; INTRAVENOUS at 19:38

## 2020-01-01 RX ADMIN — SODIUM CHLORIDE, SODIUM LACTATE, POTASSIUM CHLORIDE, AND CALCIUM CHLORIDE 500 ML: 600; 310; 30; 20 INJECTION, SOLUTION INTRAVENOUS at 00:10

## 2020-01-01 RX ADMIN — Medication 10 ML: at 14:01

## 2020-01-01 RX ADMIN — MORPHINE SULFATE 2 MG: 2 INJECTION, SOLUTION INTRAMUSCULAR; INTRAVENOUS at 11:22

## 2020-01-01 RX ADMIN — PHYTONADIONE 10 MG: 10 INJECTION, EMULSION INTRAMUSCULAR; INTRAVENOUS; SUBCUTANEOUS at 23:09

## 2020-01-01 RX ADMIN — PROPOFOL 120 MG: 10 INJECTION, EMULSION INTRAVENOUS at 20:33

## 2020-01-01 RX ADMIN — ONDANSETRON 4 MG: 2 INJECTION INTRAMUSCULAR; INTRAVENOUS at 16:56

## 2020-01-01 RX ADMIN — SODIUM CHLORIDE, SODIUM LACTATE, POTASSIUM CHLORIDE, AND CALCIUM CHLORIDE 125 ML/HR: 600; 310; 30; 20 INJECTION, SOLUTION INTRAVENOUS at 23:09

## 2020-01-01 RX ADMIN — INSULIN HUMAN 10 UNITS: 100 INJECTION, SOLUTION PARENTERAL at 16:56

## 2020-01-01 RX ADMIN — LACTULOSE 30 ML: 20 SOLUTION ORAL at 23:26

## 2020-01-01 RX ADMIN — PROPOFOL 200 MCG/KG/MIN: 10 INJECTION, EMULSION INTRAVENOUS at 20:33

## 2020-01-01 RX ADMIN — LORAZEPAM 1 MG: 2 INJECTION INTRAMUSCULAR; INTRAVENOUS at 14:44

## 2020-01-01 RX ADMIN — SODIUM BICARBONATE 50 MEQ: 84 INJECTION, SOLUTION INTRAVENOUS at 16:55

## 2020-01-01 RX ADMIN — SODIUM CHLORIDE, SODIUM LACTATE, POTASSIUM CHLORIDE, AND CALCIUM CHLORIDE: 600; 310; 30; 20 INJECTION, SOLUTION INTRAVENOUS at 20:29

## 2020-01-01 RX ADMIN — OCTREOTIDE ACETATE 50 MCG/HR: 500 INJECTION, SOLUTION INTRAVENOUS; SUBCUTANEOUS at 05:58

## 2020-01-01 RX ADMIN — ALBUMIN (HUMAN) 12.5 G: 0.25 INJECTION, SOLUTION INTRAVENOUS at 15:11

## 2020-01-01 RX ADMIN — CALCIUM CHLORIDE 1 G: 100 INJECTION, SOLUTION INTRAVENOUS; INTRAVENTRICULAR at 17:12

## 2020-10-29 NOTE — DISCHARGE INSTRUCTIONS
[address]  Department of Interventional Radiology  [address] [place] [address] [address] [facility]  [phone] Office  [phone] Fax    PARACENTESIS DISCHARGE INSTRUCTIONS    General Information:  During this procedure, the doctor will insert a needle into the abdomen to drain fluid. After the procedure, you will be able to take a deep breath much easier. The site of the puncture may ooze the first day. This will decrease and eventually stop. Paracentesis (draining fluid from the abdomen) sometimes makes patients hypotensive (low blood pressure). Your doctor may order for you to receive fluids or albumin (a volume booster) during the procedure through an IV site. Home Care Instructions:  Keep the puncture site clean and dry. No tub baths or swimming until puncture site heals. Showering is acceptable. Resume your normal diet, and resume your normal activity slowly and as you tolerate. If you are short of breath, rest. If shortness of breath does not ease, please call your ordering doctor. Fluid can re-accumulate in the chest and/or in the abdomen. If this should occur, your doctor needs to know as you may need to have the procedure done again. Call If:     You should call your Physician and/or the Radiology Nurse if you notice any signs of infection, like pus draining, or if it is swollen or reddened. Also call if you have a fever, or if you are bleeding from the puncture site more than a small amount on the dressing. Call if the puncture site keeps draining fluid. Some oozing is to be expected, but should slow and then stop. Call if you feel like you have pressure in your abdomen. SEEK IMMEDIATE CARE OR CALL 911 IF YOU SUDDENLY HAVE TROUBLE BREATHING, OR IF YOUR LIPS TURN BLUE, OR IF YOU NOTICE BLOOD IN YOUR SPUTUM. Follow-Up Instructions: Please see your ordering doctor as he/she has requested. To Reach Us:  If you have any questions about your procedure, please call the Interventional Radiology department at [phone]. After business hours (5pm) and weekends, call the answering service at [phone] and ask for the Radiologist on call to be paged. Si tiene Preguntas acerca del procedimiento, por favor llame al departamento de Radiología Intervencional al [phone]. Después de horas de oficina (5 pm) y los fines de [place], llamar al [facility] al [phone] y pregunte por el Radiologo de [place] [facility]. Interventional Radiology General Nurse Discharge    After general anesthesia or intravenous sedation, for 24 hours or while taking prescription Narcotics:  · Limit your activities  · Do not drive and operate hazardous machinery  · Do not make important personal or business decisions  · Do  not drink alcoholic beverages  · If you have not urinated within 8 hours after discharge, please contact your surgeon on call. * Please give a list of your current medications to your Primary Care Provider. * Please update this list whenever your medications are discontinued, doses are     changed, or new medications (including over-the-counter products) are added. * Please carry medication information at all times in case of emergency situations. These are general instructions for a healthy lifestyle:    No smoking/ No tobacco products/ Avoid exposure to second hand smoke  Surgeon General's Warning:  Quitting smoking now greatly reduces serious risk to your health. Obesity, smoking, and sedentary lifestyle greatly increases your risk for illness  A healthy diet, regular physical exercise & weight monitoring are important for maintaining a healthy lifestyle    You may be retaining fluid if you have a history of heart failure or if you experience any of the following symptoms:  Weight gain of 3 pounds or more overnight or 5 pounds in a week, increased swelling in our hands or feet or shortness of breath while lying flat in bed.   Please call your doctor as soon as you notice any of these symptoms; do not wait until your next office visit. Recognize signs and symptoms of STROKE:  F-face looks uneven    A-arms unable to move or move unevenly    S-speech slurred or non-existent    T-time-call 911 as soon as signs and symptoms begin-DO NOT go       Back to bed or wait to see if you get better-TIME IS BRAIN.          Date: 10/29/2020  Discharging Nurse: Nate Ho

## 2020-10-29 NOTE — PROGRESS NOTES
Paracentesis complete. 6300 ml yellow colored fluid removed. Catheter removed and manual pressure held until hemostasis. Surgical adhesive applied to puncture site. Fluid sent to lab.

## 2020-11-23 NOTE — PROGRESS NOTES
Spoke with pts wife after 2 identifiers verified. Voices understanding of instructions. Medication list was  Verified and updated as we had no meds listed for pt. Instructed him to take his omeprezole and Norvasc but hold his lasix, spironolactone, and lactulose before coming for procedure.

## 2020-11-25 NOTE — DISCHARGE INSTRUCTIONS
[address]  Department of Interventional Radiology  [facility]  [phone] Office  [phone] Fax    PARACENTESIS DISCHARGE INSTRUCTIONS    General Information:  During this procedure, the doctor will insert a needle into the abdomen to drain fluid. After the procedure, you will be able to take a deep breath much easier. The site of the puncture may ooze the first day. This will decrease and eventually stop. Paracentesis (draining fluid from the abdomen) sometimes makes patients hypotensive (low blood pressure). Your doctor may order for you to receive fluids or albumin (a volume booster) during the procedure through an IV site. Home Care Instructions:  Keep the puncture site clean and dry. No tub baths or swimming until puncture site heals. Showering is acceptable. Resume your normal diet, and resume your normal activity slowly and as you tolerate. If you are short of breath, rest. If shortness of breath does not ease, please call your ordering doctor. Fluid can re-accumulate in the chest and/or in the abdomen. If this should occur, your doctor needs to know as you may need to have the procedure done again. Call If:     You should call your Physician and/or the Radiology Nurse if you notice any signs of infection, like pus draining, or if it is swollen or reddened. Also call if you have a fever, or if you are bleeding from the puncture site more than a small amount on the dressing. Call if the puncture site keeps draining fluid. Some oozing is to be expected, but should slow and then stop. Call if you feel like you have pressure in your abdomen. SEEK IMMEDIATE CARE OR CALL 911 IF YOU SUDDENLY HAVE TROUBLE BREATHING, OR IF YOUR LIPS TURN BLUE, OR IF YOU NOTICE BLOOD IN YOUR SPUTUM. Follow-Up Instructions: Please see your ordering doctor as he/she has requested. To Reach Us:     If you have any questions about your procedure, please call the Interventional Radiology department at [phone]. After business hours (5pm) and weekends, call the answering service at [phone] and ask for the Radiologist on call to be paged. Si tiene Preguntas acerca del procedimiento, por favor llame al departamento de Radiología Intervencional al [phone]. Después de horas de oficina (5 pm) y los fines de [place], llamar al [facility] al [phone] y pregunte por el Radiologo de [facility].        Date: 11/25/2020  Discharging Nurse: Teo Bueno RN

## 2020-12-13 PROBLEM — R10.9 ABDOMINAL PAIN: Status: ACTIVE | Noted: 2020-01-01

## 2020-12-13 PROBLEM — R16.0 LIVER MASS: Status: ACTIVE | Noted: 2020-01-01

## 2020-12-13 PROBLEM — R63.0 DECREASED APPETITE: Status: ACTIVE | Noted: 2020-01-01

## 2020-12-13 PROBLEM — K70.31 ASCITES DUE TO ALCOHOLIC CIRRHOSIS (HCC): Status: ACTIVE | Noted: 2020-01-01

## 2020-12-18 NOTE — PROGRESS NOTES
Paracentesis completed. 5200ml yellow colored fluid removed. Catheter removed and manual pressure held for hemostasis. Hemostasis achieved with suture per arnp. Fluid sent to lab.

## 2020-12-18 NOTE — DISCHARGE INSTRUCTIONS
[address]  Department of Interventional Radiology  [facility]  [phone] Office  [phone] Fax    PARACENTESIS DISCHARGE INSTRUCTIONS    General Information:  During this procedure, the doctor will insert a needle into the abdomen to drain fluid. After the procedure, you will be able to take a deep breath much easier. The site of the puncture may ooze the first day. This will decrease and eventually stop. Paracentesis (draining fluid from the abdomen) sometimes makes patients hypotensive (low blood pressure). Your doctor may order for you to receive fluids or albumin (a volume booster) during the procedure through an IV site. Home Care Instructions:  Keep the puncture site clean and dry. No tub baths or swimming until puncture site heals. Showering is acceptable. Resume your normal diet, and resume your normal activity slowly and as you tolerate. If you are short of breath, rest. If shortness of breath does not ease, please call your ordering doctor. Fluid can re-accumulate in the chest and/or in the abdomen. If this should occur, your doctor needs to know as you may need to have the procedure done again. Call If:     You should call your Physician and/or the Radiology Nurse if you notice any signs of infection, like pus draining, or if it is swollen or reddened. Also call if you have a fever, or if you are bleeding from the puncture site more than a small amount on the dressing. Call if the puncture site keeps draining fluid. Some oozing is to be expected, but should slow and then stop. Call if you feel like you have pressure in your abdomen. SEEK IMMEDIATE CARE OR CALL 911 IF YOU SUDDENLY HAVE TROUBLE BREATHING, OR IF YOUR LIPS TURN BLUE, OR IF YOU NOTICE BLOOD IN YOUR SPUTUM. Follow-Up Instructions: Please see your ordering doctor as he/she has requested. To Reach Us:  If you have any questions about your procedure, please call the Interventional Radiology department at [phone]. After business hours (5pm) and weekends, call the answering service at [phone] and ask for the Radiologist on call to be paged. Si tiene Preguntas acerca del procedimiento, por favor llame al departamento de Radiología Intervencional al [phone]. Después de horas de oficina (5 pm) y los fines de [place], llamar al [facility] al [phone] y pregunte por el Radiolog[facility] de [facility]. Interventional Radiology General Nurse Discharge    After general anesthesia or intravenous sedation, for 24 hours or while taking prescription Narcotics:  · Limit your activities  · Do not drive and operate hazardous machinery  · Do not make important personal or business decisions  · Do  not drink alcoholic beverages  · If you have not urinated within 8 hours after discharge, please contact your surgeon on call. * Please give a list of your current medications to your Primary Care Provider. * Please update this list whenever your medications are discontinued, doses are     changed, or new medications (including over-the-counter products) are added. * Please carry medication information at all times in case of emergency situations. These are general instructions for a healthy lifestyle:    No smoking/ No tobacco products/ Avoid exposure to second hand smoke  Surgeon General's Warning:  Quitting smoking now greatly reduces serious risk to your health. Obesity, smoking, and sedentary lifestyle greatly increases your risk for illness  A healthy diet, regular physical exercise & weight monitoring are important for maintaining a healthy lifestyle    You may be retaining fluid if you have a history of heart failure or if you experience any of the following symptoms:  Weight gain of 3 pounds or more overnight or 5 pounds in a week, increased swelling in our hands or feet or shortness of breath while lying flat in bed.   Please call your doctor as soon as you notice any of these symptoms; do not wait until your next office visit. Recognize signs and symptoms of STROKE:  F-face looks uneven    A-arms unable to move or move unevenly    S-speech slurred or non-existent    T-time-call 911 as soon as signs and symptoms begin-DO NOT go       Back to bed or wait to see if you get better-TIME IS BRAIN.          Date: 12/18/2020  Discharging Nurse: Ryne Scherer

## 2020-12-27 PROBLEM — R18.8 CIRRHOSIS OF LIVER WITH ASCITES (HCC): Chronic | Status: ACTIVE | Noted: 2020-01-01

## 2020-12-27 PROBLEM — D62 ACUTE BLOOD LOSS ANEMIA: Status: ACTIVE | Noted: 2020-01-01

## 2020-12-27 PROBLEM — E87.5 HYPERKALEMIA: Status: ACTIVE | Noted: 2020-01-01

## 2020-12-27 PROBLEM — N17.9 AKI (ACUTE KIDNEY INJURY) (HCC): Status: ACTIVE | Noted: 2020-01-01

## 2020-12-27 PROBLEM — K92.2 UPPER GI BLEED: Status: ACTIVE | Noted: 2020-01-01

## 2020-12-27 PROBLEM — C22.0 HEPATOCELLULAR CARCINOMA (HCC): Chronic | Status: ACTIVE | Noted: 2020-01-01

## 2020-12-27 PROBLEM — E88.09 HYPOALBUMINEMIA: Status: ACTIVE | Noted: 2020-01-01

## 2020-12-27 PROBLEM — K76.82 HEPATIC ENCEPHALOPATHY: Chronic | Status: ACTIVE | Noted: 2020-01-01

## 2020-12-27 PROBLEM — K74.60 CIRRHOSIS OF LIVER WITH ASCITES (HCC): Chronic | Status: ACTIVE | Noted: 2020-01-01

## 2020-12-27 NOTE — ED TRIAGE NOTES
Patient arrives with complaint black stools since a few days ago. Patient was diagnosed with liver cancer at the beginning of the month. Patient also reports that he has not eaten anything in the past four days.

## 2020-12-28 NOTE — DISCHARGE SUMMARY
Physician Discharge Summary Patient ID: Kalee Parks [id number] 
50 y.o. 
1965 Admit date: 12/27/2020 Discharge date: 12- Diagnosis: 
1- Upper Gi bleeding due to bleeding duodenal ulcer. Has esophegeal varices per EGD due to alcoholic cirrhosis. Has also hx of peptic ulcer disease. 2- Acute kidney injury, oliguric, worsening. 3- Possible partial small bowel obstruction/ ileus per KUB. 4- Hx of hepatocellular carcinoma. Seen by Oncology and Surgery, comfort care suggested by Dr Lluvia Francisco. 5- Lactic acidosis, worsening Hospital course:  
49 y/o male with hx alcoholic cirrhosis of the liver, hepatocellular carcinoma, PUD 2014 who presents to ED with decreased PO intake, abdominal discomfort and dry heaving. He has had black stool for the past 3-4 days. He is heme positive in ED. His hemoglobin has been steadily dropping this month. Was 12.6 on 12/7, 9.3 on 12/27 at 1455 and then 8.8 at 1811. He has JOANNE with a Bun 86/Cr 2.18 and potassium 6.5. he received emergency treatment for this in ED. GI has evaluated him in ED and taking him for an urgent EGD this evening. He was started on protonix and octeotride gtt. Hospitalist service consulted for admission to ICU. Patient admitted and treated as above. Patient dropped his hemoglobin and was about to get blood transfusion. His lactic acidosis worsened as well as his kidney functions. Nephrology was consulted. Eventually, the patient expressed to his wife that he wants to be home. Family, including patient's wife decided interest in comfort care at home and hospice. I have met with family. PCP: Hernan Carranza MD 
 
Patient Instructions:  
Current Discharge Medication List  
  
START taking these medications Details  
morphine, 10 mg/5 mL, 10 mg/5 mL oral solution Take 5 mL by mouth every two (2) hours as needed for Pain for up to 3 days. Max Daily Amount: 120 mg. 
Qty: 1 Bottle, Refills: 0 Associated Diagnoses: Liver mass diazePAM (VALIUM) oral soln 1 mL by oral solution prn Q2h, 1 bottle Associated Diagnoses: Anxiety  
  
scopolamine (TRANSDERM-SCOP) 1 mg over 3 days pt3d Zofran oral solution prn tid 1 Patch by TransDERmal route every seventy-two (72) hours. Qty: 3 Patch, Refills: 1 STOP taking these medications  
  
 hydrOXYzine HCL (ATARAX) 25 mg tablet Comments:  
Reason for Stopping:   
   
 propranoloL (INDERAL) 10 mg tablet Comments:  
Reason for Stopping:   
   
 mirtazapine (REMERON) 15 mg tablet Comments:  
Reason for Stopping:   
   
 traMADoL (ULTRAM) 50 mg tablet Comments:  
Reason for Stopping:   
   
 Omeprazole delayed release (PRILOSEC D/R) 20 mg tablet Comments:  
Reason for Stopping:   
   
 furosemide (LASIX) 80 mg tablet Comments:  
Reason for Stopping:   
   
 lactulose (Constulose) 10 gram/15 mL solution Comments:  
Reason for Stopping:   
   
  
 
 
Instructions: Follow-up with Hospice Time 35 min Please send copy to primary physician.  
 
Signed: 
Ameya Poe MD 
12/28/2020 
11:57 AM

## 2020-12-28 NOTE — PROCEDURES
PROCEDURE: Esophagogastroduodenoscopy with endoclip and submucosal injection ENDOSCOPIST: Mary Jane Perez M.D. PREOPERATIVE DIAGNOSIS: GI bleed POSTOPERATIVE DIAGNOSIS: Duodenal ulcer with bleeding, esophageal varices, portal hypertensive gastropathy INSTRUMENTS: [id number] 
 
SEDATION: MAC DESCRIPTION: After informed consent was obtained, the patient was taken to the endoscopy suite and placed in the left lateral decubitus position. Intravenous sedation was administered by the Anesthesia provider. After adequate sedation had been achieved the endoscope was inserted over the tongue and through the posterior pharynx under direct visualization down the esophagus to the stomach and into the second portion of the duodenum. The endoscopic was withdrawn from that point, performing a careful survey of the mucosa. Retroflexion was performed in the gastric fundus. FINDINGS: 
Esophagus: Grade 3 varices without bleeding stigmata Stomach: Moderate portal hypertensive gastropathy Duodenum: Numerous ulcers present. An 8 mm ulcer of the duodenal bulb had a visible vessel. An endoclip was placed leading to immediate arterial bleeding. A second clip was placed, slowing the bleeding. A third clip failed to engage tissue. Epinephrine 1 cc 1:[id number] dilution was injected at the proximal and distal edges of the ulcer but did not seem to slow the bleeding. A fourth clip was placed, leading to hemostasis. The site was lavaged and watched for ~10 minutes with no further bleeding. Estimated blood loss:  0 cc-minimal 
 
IMPRESSION: Bleeding due to duodenal ulceration. He also has very large varices and is at risk for variceal bleeding caused by increase portal pressure from the protein load associated with this bleeding. PLAN:  Start PPI gtt. Continue octreotide gtt. ICU admission recommended. NPO, continue antibiotics. Start lactulose after recovery from anesthesia. He will likely have several episodes of melena tonight as the blood clears his GI tract.  
 
 
Emmie Carvalho MD

## 2020-12-28 NOTE — PROGRESS NOTES
Care Management Interventions PCP Verified by CM: Yes Transition of Care Consult (CM Consult): Home Hospice [facility] HSPTL: Yes Current Support Network: Lives with Spouse, Own Home Discharge Location Discharge Placement: Home with hospice CM reviewed chart, spoke with MD. Beth Riley with wife, Felipe Brennan [phone], at bedside. Family would like to take patient home with hospice services. Wife states they will have family assistance/support to care for patient in the home. Referral placed to [facility], per wife request. CM spoke with Luba Ambriz with [facility]. Luba Ambriz will contact wife to discuss admission process. CM will continue to follow to assist with discharge needs.

## 2020-12-28 NOTE — PROGRESS NOTES
Hospitalist  
 
Admit Date:  2020  3:54 PM  
Name:  Benigno Morales Age:  50 y.o. 
:  1965 MRN:  [id number] PCP:  Lucrecia Smith MD 
Treatment Team: Attending Provider: Michele Morris MD; Consulting Provider: Sobeida Rivas MD; Primary Nurse: Damien Shaver RN 
 
subj 51 y/o male with hx alcoholic cirrhosis of the liver, hepatocellular carcinoma, PUD  who presents to ED with decreased PO intake, abdominal discomfort and dry heaving. He has had black stool for the past 3-4 days. He is heme positive in ED. His hemoglobin has been steadily dropping this month. Was 12.6 on , 9.3 on  at 1455 and then 8.8 at 1811. He has JOANNE with a Bun 86/Cr 2.18 and potassium 6.5. he received emergency treatment for this in ED. GI has evaluated him in ED and taking him for an urgent EGD this evening. He was started on protonix and octeotride gtt. Hospitalist service consulted for admission to ICU. Today, vitals stable, Hb dropping, low urine output Objective:  
 
Patient Vitals for the past 24 hrs: 
 Temp Pulse Resp BP SpO2  
20 0837     94 % 20 0724 97.7 °F (36.5 °C) (!) 102 21 136/77 94 % 20 0602  (!) 106 (!) 33 137/77 94 % 20 0532  (!) 105 28 138/82 96 % 20 0502  (!) 108 25 129/83 96 % 20 0432  (!) 104 25 139/80 94 % 20 0402  (!) 105 24 128/83 95 % 20 0332  (!) 108 26 120/80 97 % 20 0302  (!) 107 21 131/86 95 % 20 0232  (!) 104 21 124/73 97 % 20 0202  (!) 109 (!) 32 (!) 167/91 97 % 20 0132  (!) 105 (!) 34 (!) 150/89 95 % 20 0102  (!) 108 23 (!) 170/101 95 % 20 0041  (!) 108 21 (!) 161/98 95 % 20 0039  (!) 113 21 (!) 160/99 95 % 20 0003  (!) 102 22 (!) 140/92 95 % 20 2330  (!) 103 20 (!) 145/88 94 % 20 2315  98 22 (!) 147/91 96 % 20 2300  (!) 103 18 139/84 96 % 20 2245  99 20 (!) 146/87 95 % 12/27/20 2230  99 20 (!) 142/86 96 % 12/27/20 2215  (!) 101 22 (!) 175/90 96 % 12/27/20 2210  98 18 (!) 156/95 96 % 12/27/20 2203 97.8 °F (36.6 °C) 98 29 (!) 155/99 96 % 12/27/20 2136  94 20 (!) 157/81 96 % 12/27/20 2131  94 20 (!) 167/80 96 % 12/27/20 2126  91 20 136/70 94 % 12/27/20 2121  93 20 (!) 143/88 95 % 12/27/20 2116 97 °F (36.1 °C) 94 20 (!) 167/92 95 % 12/27/20 2031  99 18 138/83 96 % 12/27/20 1950  (!) 103 18  94 % 12/27/20 1902  (!) 105 17  92 % 12/27/20 1809  (!) 105 17 135/80 93 % 12/27/20 1637  98   97 % 12/27/20 1635     97 % 12/27/20 1630    116/77   
12/27/20 1423 97.8 °F (36.6 °C) (!) 105 24 124/84 95 % Oxygen Therapy O2 Sat (%): 94 % (12/28/20 0837) Pulse via Oximetry: 104 beats per minute (12/28/20 0837) O2 Device: Nasal cannula (12/28/20 0837) O2 Flow Rate (L/min): 3 l/min (12/28/20 0837) Intake/Output Summary (Last 24 hours) at 12/28/2020 7243 Last data filed at 12/28/2020 6627 Gross per 24 hour Intake 950 ml Output 110 ml Net 840 ml Physical Exam: 
General:    Gaunt, ill-appearing, fetor hepaticus, pale. icteric CV:   Mildly tachycardic. No murmur, rub, or gallop. Lungs:  CTAB. No wheezing, rhonchi, or rales. Diminished bibasilar Abdomen: Distended moderately, hypoactive bs Extremities: Warm and dry. No cyanosis or edema. Neurologic: grossly intact. Psych:  Confused I reviewed the labs, imaging, EKGs, telemetry, and other studies done this admission. Data Review:  
Recent Results (from the past 24 hour(s)) CBC WITH AUTOMATED DIFF Collection Time: 12/27/20  2:55 PM  
Result Value Ref Range WBC 7.3 4.3 - 11.1 K/uL  
 RBC 2.64 (L) 4.23 - 5.6 M/uL HGB 9.3 (L) 13.6 - 17.2 g/dL HCT 27.0 (L) 41.1 - 50.3 % .3 (H) 79.6 - 97.8 FL  
 MCH 35.2 (H) 26.1 - 32.9 PG  
 MCHC 34.4 31.4 - 35.0 g/dL  
 RDW 17.4 (H) 11.9 - 14.6 % PLATELET 375 (L) 606 - 450 K/uL  MPV 11.0 9.4 - 12.3 FL  
 ABSOLUTE NRBC 0.00 0.0 - 0.2 K/uL  
 DF AUTOMATED NEUTROPHILS 75 43 - 78 % LYMPHOCYTES 17 13 - 44 % MONOCYTES 7 4.0 - 12.0 % EOSINOPHILS 0 (L) 0.5 - 7.8 % BASOPHILS 0 0.0 - 2.0 % IMMATURE GRANULOCYTES 1 0.0 - 5.0 %  
 ABS. NEUTROPHILS 5.5 1.7 - 8.2 K/UL  
 ABS. LYMPHOCYTES 1.2 0.5 - 4.6 K/UL  
 ABS. MONOCYTES 0.5 0.1 - 1.3 K/UL  
 ABS. EOSINOPHILS 0.0 0.0 - 0.8 K/UL  
 ABS. BASOPHILS 0.0 0.0 - 0.2 K/UL  
 ABS. IMM. GRANS. 0.1 0.0 - 0.5 K/UL METABOLIC PANEL, COMPREHENSIVE Collection Time: 12/27/20  2:55 PM  
Result Value Ref Range Sodium 134 (L) 136 - 145 mmol/L Potassium 6.5 (HH) 3.5 - 5.1 mmol/L Chloride 106 98 - 107 mmol/L  
 CO2 14 (L) 21 - 32 mmol/L Anion gap 14 7 - 16 mmol/L Glucose 136 (H) 65 - 100 mg/dL BUN 86 (H) 6 - 23 MG/DL Creatinine 2.18 (H) 0.8 - 1.5 MG/DL  
 GFR est AA 41 (L) >60 ml/min/1.73m2 GFR est non-AA 34 (L) >60 ml/min/1.73m2 Calcium 8.3 8.3 - 10.4 MG/DL Bilirubin, total 4.4 (H) 0.2 - 1.1 MG/DL  
 ALT (SGPT) 93 (H) 12 - 65 U/L  
 AST (SGOT) 195 (H) 15 - 37 U/L Alk. phosphatase 96 50 - 136 U/L Protein, total 7.5 6.3 - 8.2 g/dL Albumin 1.8 (L) 3.5 - 5.0 g/dL Globulin 5.7 (H) 2.3 - 3.5 g/dL A-G Ratio 0.3 (L) 1.2 - 3.5 TYPE & SCREEN Collection Time: 12/27/20  4:54 PM  
Result Value Ref Range Crossmatch Expiration 12/30/2020,2359 ABO/Rh(D) O NEGATIVE Antibody screen NEG Unit number [id number] Blood component type  LR Unit division 00 Status of unit ALLOCATED Crossmatch result Compatible Unit number [id number] Blood component type [facility] Unit division 00 Status of unit ALLOCATED Crossmatch result Compatible PROTHROMBIN TIME + INR Collection Time: 12/27/20  4:54 PM  
Result Value Ref Range Prothrombin time 21.9 (H) 12.5 - 14.7 sec INR 1.9 EKG, 12 LEAD, INITIAL Collection Time: 12/27/20  5:21 PM  
Result Value Ref Range  Ventricular Rate 103 BPM  
 Atrial Rate 103 BPM  
 P-R Interval 158 ms QRS Duration 92 ms Q-T Interval 308 ms QTC Calculation (Bezet) 403 ms Calculated P Axis 34 degrees Calculated R Axis 44 degrees Calculated T Axis -11 degrees Diagnosis    
  !! AGE AND GENDER SPECIFIC ECG ANALYSIS !! Sinus tachycardia Anterior infarct , age undetermined T wave abnormality, consider inferior ischemia Abnormal ECG No previous ECGs available Confirmed by [facility]  MD (), Guero Salazar ([id number]) on 12/28/2020 7:44:35 AM 
  
POTASSIUM Collection Time: 12/27/20  6:11 PM  
Result Value Ref Range Potassium 5.4 (H) 3.5 - 5.1 mmol/L  
HEMOGLOBIN Collection Time: 12/27/20  6:11 PM  
Result Value Ref Range HGB 8.8 (L) 13.6 - 17.2 g/dL  
RBC, ALLOCATE Collection Time: 12/27/20  7:15 PM  
Result Value Ref Range HISTORY CHECKED? Historical check performed HEMOGLOBIN Collection Time: 12/27/20 10:38 PM  
Result Value Ref Range HGB 8.0 (L) 13.6 - 17.2 g/dL METABOLIC PANEL, BASIC Collection Time: 12/27/20 10:38 PM  
Result Value Ref Range Sodium 136 136 - 145 mmol/L Potassium 6.2 (HH) 3.5 - 5.1 mmol/L Chloride 106 98 - 107 mmol/L  
 CO2 13 (L) 21 - 32 mmol/L Anion gap 17 (H) 7 - 16 mmol/L Glucose 150 (H) 65 - 100 mg/dL BUN 88 (H) 6 - 23 MG/DL Creatinine 2.56 (H) 0.8 - 1.5 MG/DL  
 GFR est AA 34 (L) >60 ml/min/1.73m2 GFR est non-AA 28 (L) >60 ml/min/1.73m2 Calcium 8.7 8.3 - 10.4 MG/DL MAGNESIUM Collection Time: 12/27/20 10:38 PM  
Result Value Ref Range Magnesium 2.2 1.8 - 2.4 mg/dL AMMONIA Collection Time: 12/27/20 10:38 PM  
Result Value Ref Range Ammonia 22 (L) 24.9 - 68 UMOL/L  
PHOSPHORUS Collection Time: 12/27/20 10:38 PM  
Result Value Ref Range Phosphorus 5.1 (H) 2.5 - 4.5 MG/DL  
LACTIC ACID Collection Time: 12/27/20 10:38 PM  
Result Value Ref Range Lactic acid 6.0 (HH) 0.4 - 2.0 MMOL/L  
METABOLIC PANEL, COMPREHENSIVE  Collection Time: 12/28/20  3:49 AM  
 Result Value Ref Range Sodium 136 136 - 145 mmol/L Potassium 5.9 (H) 3.5 - 5.1 mmol/L Chloride 108 (H) 98 - 107 mmol/L  
 CO2 12 (L) 21 - 32 mmol/L Anion gap 16 7 - 16 mmol/L Glucose 165 (H) 65 - 100 mg/dL BUN 90 (H) 6 - 23 MG/DL Creatinine 3.00 (H) 0.8 - 1.5 MG/DL  
 GFR est AA 28 (L) >60 ml/min/1.73m2 GFR est non-AA 23 (L) >60 ml/min/1.73m2 Calcium 8.8 8.3 - 10.4 MG/DL Bilirubin, total 3.8 (H) 0.2 - 1.1 MG/DL  
 ALT (SGPT) 111 (H) 12 - 65 U/L  
 AST (SGOT) 252 (H) 15 - 37 U/L Alk. phosphatase 86 50 - 136 U/L Protein, total 6.7 6.3 - 8.2 g/dL Albumin 1.7 (L) 3.5 - 5.0 g/dL Globulin 5.0 (H) 2.3 - 3.5 g/dL A-G Ratio 0.3 (L) 1.2 - 3.5 HEMOGLOBIN Collection Time: 12/28/20  3:49 AM  
Result Value Ref Range HGB 7.4 (L) 13.6 - 17.2 g/dL LACTIC ACID Collection Time: 12/28/20  3:49 AM  
Result Value Ref Range Lactic acid 7.2 (HH) 0.4 - 2.0 MMOL/L  
CBC WITH AUTOMATED DIFF Collection Time: 12/28/20  3:49 AM  
Result Value Ref Range WBC 8.3 4.3 - 11.1 K/uL  
 RBC 2.12 (L) 4.23 - 5.6 M/uL HGB 7.5 (L) 13.6 - 17.2 g/dL HCT 22.0 (L) 41.1 - 50.3 % .8 (H) 79.6 - 97.8 FL  
 MCH 35.4 (H) 26.1 - 32.9 PG  
 MCHC 34.1 31.4 - 35.0 g/dL  
 RDW 17.7 (H) 11.9 - 14.6 % PLATELET 217 (L) 458 - 450 K/uL MPV 10.8 9.4 - 12.3 FL ABSOLUTE NRBC 0.00 0.0 - 0.2 K/uL  
 DF AUTOMATED NEUTROPHILS 74 43 - 78 % LYMPHOCYTES 15 13 - 44 % MONOCYTES 9 4.0 - 12.0 % EOSINOPHILS 0 (L) 0.5 - 7.8 % BASOPHILS 0 0.0 - 2.0 % IMMATURE GRANULOCYTES 1 0.0 - 5.0 %  
 ABS. NEUTROPHILS 6.1 1.7 - 8.2 K/UL  
 ABS. LYMPHOCYTES 1.2 0.5 - 4.6 K/UL  
 ABS. MONOCYTES 0.8 0.1 - 1.3 K/UL  
 ABS. EOSINOPHILS 0.0 0.0 - 0.8 K/UL  
 ABS. BASOPHILS 0.0 0.0 - 0.2 K/UL  
 ABS. IMM. GRANS. 0.1 0.0 - 0.5 K/UL PROTHROMBIN TIME + INR Collection Time: 12/28/20  5:57 AM  
Result Value Ref Range Prothrombin time 20.4 (H) 12.5 - 14.7 sec INR 1.7 HEMOGLOBIN  
 Collection Time: 12/28/20  7:45 AM  
Result Value Ref Range HGB 6.8 (LL) 13.6 - 17.2 g/dL LACTIC ACID Collection Time: 12/28/20  7:46 AM  
Result Value Ref Range Lactic acid 8.1 (HH) 0.4 - 2.0 MMOL/L Imaging Studies: CXR Results  (Last 48 hours) 12/28/20 0635  XR ABD ACUTE W 1 V CHEST Final result Impression:  IMPRESSION:   
1. Normal chest.  
2. No evidence of free intraperitoneal air. 3. Mildly distended small bowel loops throughout the abdomen, possibly an ileus  
or the sequela of endoscopic insufflation although follow-up is recommended to  
exclude a distal small bowel obstruction. Narrative:  EXAM: Acute abdomen series. INDICATION: Abdominal pain, status post clipping of duodenal ulcer. COMPARISON: None. TECHNIQUE: Supine and upright views of the abdomen were supplemented with a  
frontal view of the chest.  
   
FINDINGS: The heart and lungs are normal. No pneumothorax or pleural effusion. There are mildly distended small bowel loops throughout the abdomen, measuring  
up to 3.8 cm in diameter. Minimal gas is noted in the colon. There is no fringe  
peritoneal air. CT Results  (Last 48 hours) None Assessment and Plan:  
 
1- Upper Gi bleeding due to bleeding duodenal ulcer. Has esophegeal varices per EGD due to alcoholic cirrhosis. Has also hx of PUD. 2- JOANNE, oliguric, worsening. 3- Partial SBO?, ileus? Per KUB. 4- Hx of [address] 5- lactic acidosis, worsening Rx; 
NPO, IVF PRBC 1 unit Monitor Hb and UO 
AM lab PPi iv Nephrology consulted Guarded/ poor prognosis Full code as d/w wife. Dispo; TBD Case d/w pt, wife and RN Signed: 
Diya Woodall MD

## 2020-12-28 NOTE — CONSULTS
[facility] Consult Note Primary GI Physician: Pina Corey Referring Provider:  Di Mendez Consult Date:  12/27/2020 Admit Date:  12/27/2020 Chief Complaint:  GI bleed Subjective:  
 
History of Present Illness:  Patient is a 50 y.o. male with PMH of including but not limited to cirrhosis with HCC, ascites, esophageal varices, and hepatic encephalopathy, who is seen in consultation at the request of Dr. Di Mendez for GI bleed. Patient is assisted in history by his wife at bedside. He has had melena for 4 days, last episode about 1 hour ago. This has been associated with loss of appetite and vomiting with attempts at eating or drinking. He reports having seen blood at least once when he vomited, but this was not today. He is otherwise unable to elaborate. He has been unable to take his meds for 4 days due to vomiting. He is not sure what caused this, and nothing has helped his symptoms. His wife feels that he is at least mildly confused right now. He has reported sobriety for 2 months. PMH: 
Cirrhosis Alcohol abuse Esophageal varices Ascites Hepatocellular carcinoma PSH: 
Past Surgical History:  
Procedure Laterality Date  IR PARACENTESIS ABD W IMAGE  10/29/2020  IR PARACENTESIS ABD W IMAGE  11/25/2020  IR PARACENTESIS ABD W IMAGE  12/18/2020 Allergies: 
No Known Allergies Home Medications: 
Prior to Admission medications Medication Sig Start Date End Date Taking? Authorizing Provider  
propranoloL (INDERAL) 10 mg tablet Take  by mouth three (3) times daily. Provider, Historical  
mirtazapine (REMERON) 15 mg tablet Take 1 Tab by mouth nightly. 12/7/20   Jose Bai MD  
traMADoL Corine Killings) 50 mg tablet Take 1 Tab by mouth every eight (8) hours as needed for Pain (take 0.5 tab - 1 tab every 8 hours as needed for pain.) for up to 30 days.  Max Daily Amount: 150 mg. 12/7/20 1/6/21  Jose Bai MD  
 Omeprazole delayed release (PRILOSEC D/R) 20 mg tablet Take 20 mg by mouth. Provider, Historical  
amLODIPine (NORVASC) 5 mg tablet Take 5 mg by mouth daily. Provider, Historical  
furosemide (LASIX) 80 mg tablet Take 80 mg by mouth daily. Provider, Historical  
lactulose (Constulose) 10 gram/15 mL solution Take 1 tsp by mouth three (3) times daily. Provider, Historical  
spironolactone (ALDACTONE) 100 mg tablet Take 200 mg by mouth daily. Provider, Historical  
 
 
Hospital Medications: 
Current Facility-Administered Medications Medication Dose Route Frequency  octreotide (SANDOSTATIN) 500 mcg in 0.9% sodium chloride 500 mL infusion  50 mcg/hr IntraVENous CONTINUOUS  
 cefTRIAXone (ROCEPHIN) 1 g in 0.9% sodium chloride (MBP/ADV) 50 mL MBP  1 g IntraVENous Q24H Current Outpatient Medications Medication Sig  propranoloL (INDERAL) 10 mg tablet Take  by mouth three (3) times daily.  mirtazapine (REMERON) 15 mg tablet Take 1 Tab by mouth nightly.  traMADoL (ULTRAM) 50 mg tablet Take 1 Tab by mouth every eight (8) hours as needed for Pain (take 0.5 tab - 1 tab every 8 hours as needed for pain.) for up to 30 days. Max Daily Amount: 150 mg.  
 Omeprazole delayed release (PRILOSEC D/R) 20 mg tablet Take 20 mg by mouth.  amLODIPine (NORVASC) 5 mg tablet Take 5 mg by mouth daily.  furosemide (LASIX) 80 mg tablet Take 80 mg by mouth daily.  lactulose (Constulose) 10 gram/15 mL solution Take 1 tsp by mouth three (3) times daily.  spironolactone (ALDACTONE) 100 mg tablet Take 200 mg by mouth daily. Social History: 
Social History Tobacco Use  Smoking status: Former Smoker  Smokeless tobacco: Former User Quit date: 12/7/2009 Substance Use Topics  Alcohol use: Not on file Family History: No family history on file. Review of Systems: A detailed 10 system ROS is obtained, with pertinent positives as listed above. All others are negative. Diet:  Sips of water this evening Objective:  
 
Physical Exam: 
Vitals: 
Visit Vitals /80 Pulse (!) 105 Temp 97.8 °F (36.6 °C) Resp 17 Ht 5' 9\" (1.753 m) Wt 81.6 kg (180 lb) SpO2 92% BMI 26.58 kg/m² Gen:  Pt is alert, cooperative, no acute distress, cachectic, jaundiced HEENT: Sclerae icteric. Extra-occular muscles are intact. No oral ulcers. No abnormal pigmentation of the lips. The neck is supple. Cardiovascular: Tachy, regularrhythm. No murmurs, gallops, or rubs. Respiratory:  Comfortable breathing with no accessory muscle use. Clear breath sounds anteriorly with no wheezes, rales, or rhonchi. GI:  Abdomen distended with obvious ascites, soft, and nontender. Normal active bowel sounds. Rectal:  Deferred Musculoskeletal:  No pitting edema of the lower legs. Neurological:  Gross memory appears intact. Easily distracted. Positive asterixis Psychiatric:  Mood appears appropriate with judgement intact. Lymphatic:  No cervical or supraclavicular adenopathy. Laboratory:   
Recent Labs  
  12/27/20 
1811 12/27/20 
1654 12/27/20 
1455 WBC  --   --  7.3 HGB 8.8*  --  9.3* HCT  --   --  27.0*  
PLT  --   --  121* MCV  --   --  102.3* NA  --   --  134* K 5.4*  --  6.5*  
CL  --   --  106 CO2  --   --  14* BUN  --   --  86* CREA  --   --  2.18* CA  --   --  8.3 GLU  --   --  136* AP  --   --  96 ALT  --   --  93* TBILI  --   --  4.4* ALB  --   --  1.8* TP  --   --  7.5 PTP  --  21.9*  --   
INR  --  1.9  --   
  
 
 
Assessment:  
 
Principal Problem: 
  Upper GI bleed (12/27/2020) Hemodynamically stable but with >30% hgb drop in <3 weeks. With dry heaves, variceal bleeding is unlikely but cannot be ruled out. At risk for PUD, vascular anomalies due to cirrhosis. He is at high risk for rapid decompensation. Emergent EGD is indicated. Active Problems: 
  Cir[facility]osis of [facility] with ascites ([facility] (12/27/2020) [facility] (12/27/2020) Acute blood loss anemia (12/27/2020)  Typed and crossed already. No need for transfusion yet. Hepatic encephalopathy ([facility] (12/27/2020) Currently mild to moderate. Plan:  
 
Emergent EGD Agree with empiric protonix and octreotide. Will start abx for SBP prophylaxis. Will resume Lactulose after EGD. Further plans pending EGD.  
 
Ozzie Leblanc MD

## 2020-12-28 NOTE — PERIOP NOTES
TRANSFER - OUT REPORT: 
 
Verbal report given to Ludmila OBREGON (name) on Adela Chinchilla  being transferred to [facility] (unit) for routine post - op Report consisted of patients Situation, Background, Assessment and  
Recommendations(SBAR). Information from the following report(s) SBAR was reviewed with the receiving nurse. Lines:  
Peripheral IV 12/27/20 Right Antecubital (Active) Site Assessment Clean, dry, & intact 12/27/20 2138 Phlebitis Assessment 0 12/27/20 2138 Infiltration Assessment 0 12/27/20 2138 Dressing Status Clean, dry, & intact 12/27/20 2138 Dressing Type Tape;Transparent 12/27/20 2138 Hub Color/Line Status Infusing 12/27/20 2138 Action Taken Blood drawn 12/27/20 1453 Peripheral IV 12/27/20 Left Wrist (Active) Site Assessment Clean, dry, & intact 12/27/20 2138 Phlebitis Assessment 0 12/27/20 2138 Infiltration Assessment 0 12/27/20 2138 Dressing Status Clean, dry, & intact 12/27/20 2138 Dressing Type Tape;Transparent 12/27/20 2138 Hub Color/Line Status Infusing 12/27/20 2138 Opportunity for questions and clarification was provided. Patient transported with: 
 O2 @ 3 liters

## 2020-12-28 NOTE — PROGRESS NOTES
Wife given extensive update over the phone regarding patient's condition and lab work. She is on her way to be with patient this morning and talk with doctors.

## 2020-12-28 NOTE — PROGRESS NOTES
Care Management Interventions PCP Verified by CM: Yes Mode of Transport at Discharge: BLS([facility] Transport Time of Discharge: 1600 Transition of Care Consult (CM Consult): Home Hospice [facility] HSPTL: Yes Current Support Network: Own Home, Lives with Spouse The Patient and/or Patient Representative was Provided with a Choice of Provider and Agrees with the Discharge Plan?: Yes Freedom of Choice List was Provided with Basic Dialogue that Supports the Patient's Individualized Plan of Care/Goals, Treatment Preferences and Shares the Quality Data Associated with the Providers?: Yes Discharge Location Discharge Placement: Home with hospice Patient to be discharged home with [facility] today. Transport has been arranged through [facility] for 1600; primary RN aware. DNR form has been signed. Prescriptions for Scopolamine, Morphine, Valium and Zofran placed in packet to be left in home.

## 2020-12-28 NOTE — PROGRESS NOTES
Patient has only had 40 ml of urine since admission. Dominguez placed for accurate measurements. Dr. Helen Hernandez aware.

## 2020-12-28 NOTE — PROGRESS NOTES
Pt asked for medication to help him rest last night. Dr. Ranjit Howard asked that I wait to see if phenergan helped him rest first but then he could have Ativan if needed. Patient was still asking for \"sleep medication\" 2 hours after phenergan so Ativan was given. Patient immediately became restless and agitated. He has been confused and attempting to get out of bed all night. He pulled out an IV while agitated. He has had a total of 60 ml of urine out all night since Dominguez was placed (40 of that was immediately after Dominguez placement). Creatinine continuing to climb. Dr. Ranjit Howard aware and ordered Nephrology consult. Dr. Ranjit Howard wanted a CT Urogram but patient is too agitated for CT scan. Dr. Ranjit Hoawrd updated and abdominal x-ray done instead. Haldol and morphine ordered. Patient somewhat calmer. Will continue to monitor.

## 2020-12-28 NOTE — PROGRESS NOTES
TRANSFER - OUT REPORT: 
 
Verbal report given to Ashley RN(name) on Rafaela Estevez  being transferred to ICU(unit) for routine post - op Report consisted of patients Situation, Background, Assessment and  
Recommendations(SBAR). Information from the following report(s) SBAR and Kardex was reviewed with the receiving nurse. Lines:  
Peripheral IV 12/27/20 Right Antecubital (Active) Site Assessment Clean, dry, & intact 12/27/20 2120 Phlebitis Assessment 0 12/27/20 2120 Infiltration Assessment 0 12/27/20 2120 Dressing Status Clean, dry, & intact 12/27/20 2120 Dressing Type Tape;Transparent 12/27/20 2120 Hub Color/Line Status Capped 12/27/20 2120 Action Taken Blood drawn 12/27/20 1453 Peripheral IV 12/27/20 Left Wrist (Active) Site Assessment Clean, dry, & intact 12/27/20 2120 Phlebitis Assessment 0 12/27/20 2120 Infiltration Assessment 0 12/27/20 2120 Dressing Status Clean, dry, & intact 12/27/20 2120 Dressing Type Tape;Transparent 12/27/20 2120 Hub Color/Line Status Infusing 12/27/20 2120 Opportunity for questions and clarification was provided. Patient transported with: 
 O2 @ 3 liters

## 2020-12-28 NOTE — PROGRESS NOTES
TRANSFER - IN REPORT: 
 
Verbal report received from MINDI Israel on Radha Mcmillan  being received from PACU for routine progression of care Report consisted of patients Situation, Background, Assessment and  
Recommendations(SBAR). Information from the following report(s) SBAR, Kardex, OR Summary, Procedure Summary, Intake/Output, MAR and Recent Results was reviewed with the receiving nurse. Opportunity for questions and clarification was provided. Assessment completed upon patients arrival to unit and care assumed.

## 2020-12-28 NOTE — HOSPICE
[facility]- 
 
I will reach out to the pt's wife and talk about hospice services. [address] wants to take her  home with hospice support for his final days. She states that pt is a DNR. Their 3 adult children will also help with his care. Dr. Tonia Mcdonald has approved pt for routine hospice care and has requested 4 comfort rx to be sent home with pt. The plan- 
1. DME to be delivered at 1500 today- bed, table, 02 set up, WC, and BSC. 2.  [facility] is set up for 1600 transport home. 3.  [facility] RN to admit after arrival home today. 4. Comfort rx to be sent home please- Morphine conc 20mg/ml  0.5ml=10mg PO/SL Q 3 hrs prn pain/SOB  #30ml. Haldol 2mg/ml   2mg PO/SL Q 4hr prn N/V unrelieved by zofran. #30ml. Zofran sublingual tabs 8mg  1 PO/SL Q 8hrs prn N/V. Ativan 2mg/ml    2mg PO/SL Q 4hrs prn anxiety/anxiety  #30ml. Thank you for this referral- 
 
Catha Blizzard RN BSN Hospice Liaison [phone] Thank you for this referral- 
 
Catha Blizzard RN BSN Hospice Liaison [phone]

## 2020-12-28 NOTE — PROGRESS NOTES
Referral received to assist patient in completing HCPOA. Patient is not alert/oriented at this time to complete. Support given to wife, son and daughter. Family has decided on comfort measures. I will continue to support as needed. Damian Thorpe M.Div.

## 2020-12-28 NOTE — ED NOTES
TRANSFER - OUT REPORT: 
 
Verbal report given to [place] on Dakota Guthrie  being transferred to GI Lab for routine progression of care Report consisted of patients Situation, Background, Assessment and  
Recommendations(SBAR). Information from the following report(s) SBAR, Kardex, ED Summary, [facility] and Recent Results was reviewed with the receiving nurse. Lines:  
Peripheral IV 12/27/20 Right Antecubital (Active) Site Assessment Clean, dry, & intact 12/27/20 1453 Phlebitis Assessment 0 12/27/20 1453 Infiltration Assessment 0 12/27/20 1453 Dressing Status Clean, dry, & intact 12/27/20 1453 Dressing Type Gauze;Tape;Transparent 12/27/20 1453 Hub Color/Line Status Pink;Flushed 12/27/20 1453 Action Taken Blood drawn 12/27/20 1453 Peripheral IV 12/27/20 Left Wrist (Active) Opportunity for questions and clarification was provided. Patient transported with: 
 Registered Nurse

## 2020-12-28 NOTE — PROGRESS NOTES
Noted that the plan is for discharge with hospice care. Please continue PPI BID for prevention of re-bleeding from DU. Please start lactulose for prevention of severe confusion due to hepatic encephalopathy Call us if we can help.  
Gary Fernandez MD

## 2020-12-28 NOTE — PROGRESS NOTES
GI bleeding for 4 days with h/o varices and tumor thrombus of PV. Currently hemodynamically stable. Mild encephalopathy. High risk for decompensation. Emergent EGD indicated. Full consult to follow.

## 2020-12-28 NOTE — ANESTHESIA POSTPROCEDURE EVALUATION
Procedure(s): ESOPHAGOGASTRODUODENOSCOPY (EGD) ENDOSCOPIC BANDING OR LIGATION INJECTION. total IV anesthesia Anesthesia Post Evaluation Multimodal analgesia: multimodal analgesia used between 6 hours prior to anesthesia start to PACU discharge Patient location during evaluation: PACU Patient participation: complete - patient participated Level of consciousness: awake (back to baseline neurological status) Pain management: adequate Airway patency: patent Anesthetic complications: no 
Cardiovascular status: acceptable Respiratory status: acceptable Hydration status: acceptable Post anesthesia nausea and vomiting:  controlled Final Post Anesthesia Temperature Assessment:  Normothermia (36.0-37.5 degrees C) INITIAL Post-op Vital signs:  
Vitals Value Taken Time /81 12/27/20 2136 Temp 36.1 °C (97 °F) 12/27/20 2116 Pulse 94 12/27/20 2136 Resp 20 12/27/20 2136 SpO2 96 % 12/27/20 2136

## 2020-12-28 NOTE — ANESTHESIA PREPROCEDURE EVALUATION
Relevant Problems No relevant active problems Anesthetic History No history of anesthetic complications Review of Systems / Medical History Patient summary reviewed and pertinent labs reviewed Pulmonary Smoker (former) Neuro/Psych Within defined limits Cardiovascular Exercise tolerance[de-identified] Questionable 4 METs GI/Hepatic/Renal 
  
 
 
Renal disease: CRI Liver disease (Liver CA and cirrhosis ) Comments: Melena for a few days  Endo/Other Anemia Other Findings Comments: EtOH abuse Patient is currently HD stable with no signs of brisk bleeding or active hematemesis/N/V Physical Exam 
 
Airway Mallampati: II 
TM Distance: 4 - 6 cm Neck ROM: normal range of motion Mouth opening: Normal 
 
 Cardiovascular Rhythm: regular Comments: Mildly tachy Dental 
 
Dentition: Poor dentition Pulmonary Breath sounds clear to auscultation Abdominal 
GI exam deferred Other Findings Anesthetic Plan ASA: 4, emergent Anesthesia type: total IV anesthesia Induction: Intravenous Anesthetic plan and risks discussed with: Patient

## 2020-12-28 NOTE — PROGRESS NOTES
Skin assessment done by this RN and Gilford Reilly, RN. Patient with area of scarring on the tip of his nose which wife states was from previous skin cancer. There is also a large area of bruising with old puncture wound to the right side of abdomen which wife states was from a paracentesis one week before [place]. Pt currently has a large amount of ascites. Skin with yellow coloring. Pt has some discoloration to bilateral lower extremities. No other skin breakdown noted. Allevyn applied to sacrum but patient is having frequent bloody bowel movements so it was removed for now. Extra protective cream applied to sacrum.

## 2021-01-01 ENCOUNTER — HOME CARE VISIT (OUTPATIENT)
Dept: HOSPICE | Facility: HOSPICE | Age: 56
End: 2021-01-01
Payer: MEDICAID

## 2021-01-01 ENCOUNTER — HOME CARE VISIT (OUTPATIENT)
Dept: SCHEDULING | Facility: HOME HEALTH | Age: 56
End: 2021-01-01
Payer: MEDICAID

## 2021-01-01 VITALS — DIASTOLIC BLOOD PRESSURE: 40 MMHG | HEART RATE: 128 BPM | SYSTOLIC BLOOD PRESSURE: 94 MMHG | RESPIRATION RATE: 16 BRPM

## 2021-01-01 VITALS
DIASTOLIC BLOOD PRESSURE: 68 MMHG | HEART RATE: 96 BPM | RESPIRATION RATE: 24 BRPM | SYSTOLIC BLOOD PRESSURE: 124 MMHG | TEMPERATURE: 98.2 F | HEART RATE: 96 BPM | RESPIRATION RATE: 28 BRPM | TEMPERATURE: 98.2 F | DIASTOLIC BLOOD PRESSURE: 74 MMHG | SYSTOLIC BLOOD PRESSURE: 110 MMHG

## 2021-01-01 PROCEDURE — 3331090004 HSPC SERVICE INTENSITY ADD-ON

## 2021-01-01 PROCEDURE — 0651 HSPC ROUTINE HOME CARE

## 2021-01-01 PROCEDURE — G0299 HHS/HOSPICE OF RN EA 15 MIN: HCPCS

## 2021-01-04 ENCOUNTER — HOME CARE VISIT (OUTPATIENT)
Dept: HOSPICE | Facility: HOSPICE | Age: 56
End: 2021-01-04
Payer: MEDICAID

## 2021-01-04 NOTE — PROGRESS NOTES
made Bereavement call.  contacted and spoke with [place], pt's wife, and offered our condolences letting her know that she and her family were in our thoughts and prayers.  introduced himself and shared that he had read where Nicho Hurst had visited with her via phone and mentioned that they would be using her son's . [place] thanked the  for calling and shared that was correct in fact the  had already reached out to them. [place] stated that she is doing ok and beginning to accept what has happened. Said that her children and the [employer] are very supportive at this time.  shared that if they needed anything to please let us know and let her know that the Bereavement Coordinator would also be calling in the near future to check on her and see how she is doing.  mentioned resources that may be available for her use. Family grieving appropriately. NOTE:   tried to chart this note in Epic Hyperspace but was unable to do so. Will discuss with Bereavement Coordinator.

## 2021-01-15 ENCOUNTER — HOME CARE VISIT (OUTPATIENT)
Dept: HOSPICE | Facility: HOSPICE | Age: 56
End: 2021-01-15
Payer: MEDICAID

## 2021-07-30 LAB
ALBUMIN FLD-MCNC: NORMAL G/DL
SPECIMEN SOURCE FLD: NORMAL

## (undated) DEVICE — KENDALL RADIOLUCENT FOAM MONITORING ELECTRODE RECTANGULAR SHAPE: Brand: KENDALL

## (undated) DEVICE — CANNULA NSL ORAL AD FOR CAPNOFLEX CO2 O2 AIRLFE

## (undated) DEVICE — CONNECTOR TBNG OD5-7MM O2 END DISP

## (undated) DEVICE — BLOCK BITE AD 60FR W/ VELC STRP ADDRESSES MOST PT AND

## (undated) DEVICE — WORKING LENGTH 155CM, WORKING CHANNEL 2.8MM: Brand: RESOLUTION 360 CLIP

## (undated) DEVICE — NEEDLE INJ 25GA P5MM SHFT L230CM SHTH DIA2.5MM S STL TEF